# Patient Record
Sex: MALE | Race: WHITE | NOT HISPANIC OR LATINO | URBAN - METROPOLITAN AREA
[De-identification: names, ages, dates, MRNs, and addresses within clinical notes are randomized per-mention and may not be internally consistent; named-entity substitution may affect disease eponyms.]

---

## 2017-03-03 ENCOUNTER — OUTPATIENT (OUTPATIENT)
Dept: OUTPATIENT SERVICES | Facility: HOSPITAL | Age: 77
LOS: 1 days | Discharge: HOME | End: 2017-03-03

## 2017-06-13 ENCOUNTER — OUTPATIENT (OUTPATIENT)
Dept: OUTPATIENT SERVICES | Facility: HOSPITAL | Age: 77
LOS: 1 days | Discharge: HOME | End: 2017-06-13

## 2017-06-28 DIAGNOSIS — R53.83 OTHER FATIGUE: ICD-10-CM

## 2017-06-28 DIAGNOSIS — E55.9 VITAMIN D DEFICIENCY, UNSPECIFIED: ICD-10-CM

## 2017-06-28 DIAGNOSIS — E03.9 HYPOTHYROIDISM, UNSPECIFIED: ICD-10-CM

## 2017-06-28 DIAGNOSIS — D64.9 ANEMIA, UNSPECIFIED: ICD-10-CM

## 2017-06-28 DIAGNOSIS — N39.0 URINARY TRACT INFECTION, SITE NOT SPECIFIED: ICD-10-CM

## 2017-06-28 DIAGNOSIS — R79.9 ABNORMAL FINDING OF BLOOD CHEMISTRY, UNSPECIFIED: ICD-10-CM

## 2017-06-28 DIAGNOSIS — E13.9 OTHER SPECIFIED DIABETES MELLITUS WITHOUT COMPLICATIONS: ICD-10-CM

## 2017-08-09 DIAGNOSIS — R79.9 ABNORMAL FINDING OF BLOOD CHEMISTRY, UNSPECIFIED: ICD-10-CM

## 2017-08-09 DIAGNOSIS — E13.9 OTHER SPECIFIED DIABETES MELLITUS WITHOUT COMPLICATIONS: ICD-10-CM

## 2017-08-09 DIAGNOSIS — E78.2 MIXED HYPERLIPIDEMIA: ICD-10-CM

## 2017-08-09 DIAGNOSIS — E03.9 HYPOTHYROIDISM, UNSPECIFIED: ICD-10-CM

## 2017-08-09 DIAGNOSIS — D64.9 ANEMIA, UNSPECIFIED: ICD-10-CM

## 2017-08-09 DIAGNOSIS — E55.9 VITAMIN D DEFICIENCY, UNSPECIFIED: ICD-10-CM

## 2017-08-09 DIAGNOSIS — N40.0 BENIGN PROSTATIC HYPERPLASIA WITHOUT LOWER URINARY TRACT SYMPTOMS: ICD-10-CM

## 2017-08-09 DIAGNOSIS — R53.83 OTHER FATIGUE: ICD-10-CM

## 2017-08-24 ENCOUNTER — OUTPATIENT (OUTPATIENT)
Dept: OUTPATIENT SERVICES | Facility: HOSPITAL | Age: 77
LOS: 1 days | Discharge: HOME | End: 2017-08-24

## 2017-08-24 DIAGNOSIS — E04.2 NONTOXIC MULTINODULAR GOITER: ICD-10-CM

## 2017-10-13 ENCOUNTER — OUTPATIENT (OUTPATIENT)
Dept: OUTPATIENT SERVICES | Facility: HOSPITAL | Age: 77
LOS: 1 days | Discharge: HOME | End: 2017-10-13

## 2017-10-13 DIAGNOSIS — E13.9 OTHER SPECIFIED DIABETES MELLITUS WITHOUT COMPLICATIONS: ICD-10-CM

## 2017-10-13 DIAGNOSIS — D64.9 ANEMIA, UNSPECIFIED: ICD-10-CM

## 2017-10-13 DIAGNOSIS — E55.9 VITAMIN D DEFICIENCY, UNSPECIFIED: ICD-10-CM

## 2017-10-13 DIAGNOSIS — N40.0 BENIGN PROSTATIC HYPERPLASIA WITHOUT LOWER URINARY TRACT SYMPTOMS: ICD-10-CM

## 2017-10-13 DIAGNOSIS — E78.2 MIXED HYPERLIPIDEMIA: ICD-10-CM

## 2017-10-13 DIAGNOSIS — D52.9 FOLATE DEFICIENCY ANEMIA, UNSPECIFIED: ICD-10-CM

## 2017-10-13 DIAGNOSIS — R53.83 OTHER FATIGUE: ICD-10-CM

## 2017-10-13 DIAGNOSIS — R79.9 ABNORMAL FINDING OF BLOOD CHEMISTRY, UNSPECIFIED: ICD-10-CM

## 2017-10-13 DIAGNOSIS — E53.8 DEFICIENCY OF OTHER SPECIFIED B GROUP VITAMINS: ICD-10-CM

## 2017-10-13 DIAGNOSIS — N39.0 URINARY TRACT INFECTION, SITE NOT SPECIFIED: ICD-10-CM

## 2017-10-13 DIAGNOSIS — E03.9 HYPOTHYROIDISM, UNSPECIFIED: ICD-10-CM

## 2017-10-13 DIAGNOSIS — D51.9 VITAMIN B12 DEFICIENCY ANEMIA, UNSPECIFIED: ICD-10-CM

## 2017-10-13 DIAGNOSIS — D51.8 OTHER VITAMIN B12 DEFICIENCY ANEMIAS: ICD-10-CM

## 2017-11-21 ENCOUNTER — EMERGENCY (EMERGENCY)
Facility: HOSPITAL | Age: 77
LOS: 0 days | Discharge: HOME | End: 2017-11-21
Admitting: INTERNAL MEDICINE

## 2017-11-21 DIAGNOSIS — E78.00 PURE HYPERCHOLESTEROLEMIA, UNSPECIFIED: ICD-10-CM

## 2017-11-21 DIAGNOSIS — M54.2 CERVICALGIA: ICD-10-CM

## 2017-11-21 DIAGNOSIS — M62.838 OTHER MUSCLE SPASM: ICD-10-CM

## 2018-01-27 ENCOUNTER — OUTPATIENT (OUTPATIENT)
Dept: OUTPATIENT SERVICES | Facility: HOSPITAL | Age: 78
LOS: 1 days | Discharge: HOME | End: 2018-01-27

## 2018-01-27 DIAGNOSIS — E78.2 MIXED HYPERLIPIDEMIA: ICD-10-CM

## 2018-01-27 DIAGNOSIS — R53.83 OTHER FATIGUE: ICD-10-CM

## 2018-01-27 DIAGNOSIS — E55.9 VITAMIN D DEFICIENCY, UNSPECIFIED: ICD-10-CM

## 2018-01-27 DIAGNOSIS — E03.9 HYPOTHYROIDISM, UNSPECIFIED: ICD-10-CM

## 2018-01-27 DIAGNOSIS — N40.0 BENIGN PROSTATIC HYPERPLASIA WITHOUT LOWER URINARY TRACT SYMPTOMS: ICD-10-CM

## 2018-01-27 DIAGNOSIS — R79.9 ABNORMAL FINDING OF BLOOD CHEMISTRY, UNSPECIFIED: ICD-10-CM

## 2018-01-27 DIAGNOSIS — E13.9 OTHER SPECIFIED DIABETES MELLITUS WITHOUT COMPLICATIONS: ICD-10-CM

## 2018-01-27 DIAGNOSIS — D51.9 VITAMIN B12 DEFICIENCY ANEMIA, UNSPECIFIED: ICD-10-CM

## 2018-01-27 DIAGNOSIS — N39.0 URINARY TRACT INFECTION, SITE NOT SPECIFIED: ICD-10-CM

## 2018-01-27 DIAGNOSIS — D52.9 FOLATE DEFICIENCY ANEMIA, UNSPECIFIED: ICD-10-CM

## 2018-02-27 ENCOUNTER — OUTPATIENT (OUTPATIENT)
Dept: OUTPATIENT SERVICES | Facility: HOSPITAL | Age: 78
LOS: 1 days | Discharge: HOME | End: 2018-02-27

## 2018-02-27 DIAGNOSIS — E03.9 HYPOTHYROIDISM, UNSPECIFIED: ICD-10-CM

## 2018-08-24 ENCOUNTER — OUTPATIENT (OUTPATIENT)
Dept: OUTPATIENT SERVICES | Facility: HOSPITAL | Age: 78
LOS: 1 days | Discharge: HOME | End: 2018-08-24

## 2018-08-24 DIAGNOSIS — N40.0 BENIGN PROSTATIC HYPERPLASIA WITHOUT LOWER URINARY TRACT SYMPTOMS: ICD-10-CM

## 2018-08-24 DIAGNOSIS — E53.8 DEFICIENCY OF OTHER SPECIFIED B GROUP VITAMINS: ICD-10-CM

## 2018-08-24 DIAGNOSIS — R53.83 OTHER FATIGUE: ICD-10-CM

## 2018-08-24 DIAGNOSIS — Z00.00 ENCOUNTER FOR GENERAL ADULT MEDICAL EXAMINATION WITHOUT ABNORMAL FINDINGS: ICD-10-CM

## 2018-08-24 DIAGNOSIS — D52.9 FOLATE DEFICIENCY ANEMIA, UNSPECIFIED: ICD-10-CM

## 2018-08-24 DIAGNOSIS — E55.9 VITAMIN D DEFICIENCY, UNSPECIFIED: ICD-10-CM

## 2018-08-24 DIAGNOSIS — N39.0 URINARY TRACT INFECTION, SITE NOT SPECIFIED: ICD-10-CM

## 2018-08-24 DIAGNOSIS — E78.2 MIXED HYPERLIPIDEMIA: ICD-10-CM

## 2018-08-24 DIAGNOSIS — E03.9 HYPOTHYROIDISM, UNSPECIFIED: ICD-10-CM

## 2018-08-24 DIAGNOSIS — E13.9 OTHER SPECIFIED DIABETES MELLITUS WITHOUT COMPLICATIONS: ICD-10-CM

## 2018-08-24 DIAGNOSIS — D51.9 VITAMIN B12 DEFICIENCY ANEMIA, UNSPECIFIED: ICD-10-CM

## 2018-08-24 DIAGNOSIS — R79.9 ABNORMAL FINDING OF BLOOD CHEMISTRY, UNSPECIFIED: ICD-10-CM

## 2019-01-30 ENCOUNTER — OUTPATIENT (OUTPATIENT)
Dept: OUTPATIENT SERVICES | Facility: HOSPITAL | Age: 79
LOS: 1 days | Discharge: HOME | End: 2019-01-30

## 2019-01-30 DIAGNOSIS — E04.2 NONTOXIC MULTINODULAR GOITER: ICD-10-CM

## 2019-08-01 ENCOUNTER — INPATIENT (INPATIENT)
Facility: HOSPITAL | Age: 79
LOS: 4 days | Discharge: NURSING, REHAB OR LONG-TERM CARE | End: 2019-08-06
Attending: SURGERY | Admitting: SURGERY
Payer: COMMERCIAL

## 2019-08-01 VITALS
SYSTOLIC BLOOD PRESSURE: 165 MMHG | DIASTOLIC BLOOD PRESSURE: 80 MMHG | RESPIRATION RATE: 20 BRPM | OXYGEN SATURATION: 98 % | HEART RATE: 63 BPM | TEMPERATURE: 97 F

## 2019-08-01 DIAGNOSIS — Z98.890 OTHER SPECIFIED POSTPROCEDURAL STATES: Chronic | ICD-10-CM

## 2019-08-01 DIAGNOSIS — Z90.49 ACQUIRED ABSENCE OF OTHER SPECIFIED PARTS OF DIGESTIVE TRACT: Chronic | ICD-10-CM

## 2019-08-01 LAB
ALBUMIN SERPL ELPH-MCNC: 3.9 G/DL — SIGNIFICANT CHANGE UP (ref 3.5–5.2)
ALP SERPL-CCNC: 65 U/L — SIGNIFICANT CHANGE UP (ref 30–115)
ALT FLD-CCNC: 27 U/L — SIGNIFICANT CHANGE UP (ref 0–41)
ANION GAP SERPL CALC-SCNC: 11 MMOL/L — SIGNIFICANT CHANGE UP (ref 7–14)
APPEARANCE UR: CLEAR — SIGNIFICANT CHANGE UP
APTT BLD: 33.5 SEC — SIGNIFICANT CHANGE UP (ref 27–39.2)
AST SERPL-CCNC: 19 U/L — SIGNIFICANT CHANGE UP (ref 0–41)
BASE EXCESS BLDV CALC-SCNC: 3.6 MMOL/L — HIGH (ref -2–2)
BASOPHILS # BLD AUTO: 0.03 K/UL — SIGNIFICANT CHANGE UP (ref 0–0.2)
BASOPHILS NFR BLD AUTO: 0.3 % — SIGNIFICANT CHANGE UP (ref 0–1)
BILIRUB SERPL-MCNC: 0.3 MG/DL — SIGNIFICANT CHANGE UP (ref 0.2–1.2)
BILIRUB UR-MCNC: NEGATIVE — SIGNIFICANT CHANGE UP
BLD GP AB SCN SERPL QL: SIGNIFICANT CHANGE UP
BUN SERPL-MCNC: 11 MG/DL — SIGNIFICANT CHANGE UP (ref 10–20)
CA-I SERPL-SCNC: 1.24 MMOL/L — SIGNIFICANT CHANGE UP (ref 1.12–1.3)
CALCIUM SERPL-MCNC: 9.7 MG/DL — SIGNIFICANT CHANGE UP (ref 8.5–10.1)
CHLORIDE SERPL-SCNC: 104 MMOL/L — SIGNIFICANT CHANGE UP (ref 98–110)
CO2 SERPL-SCNC: 25 MMOL/L — SIGNIFICANT CHANGE UP (ref 17–32)
COLOR SPEC: SIGNIFICANT CHANGE UP
CREAT SERPL-MCNC: 1 MG/DL — SIGNIFICANT CHANGE UP (ref 0.7–1.5)
DIFF PNL FLD: NEGATIVE — SIGNIFICANT CHANGE UP
EOSINOPHIL # BLD AUTO: 0.06 K/UL — SIGNIFICANT CHANGE UP (ref 0–0.7)
EOSINOPHIL NFR BLD AUTO: 0.7 % — SIGNIFICANT CHANGE UP (ref 0–8)
GAS PNL BLDV: 139 MMOL/L — SIGNIFICANT CHANGE UP (ref 136–145)
GAS PNL BLDV: SIGNIFICANT CHANGE UP
GLUCOSE SERPL-MCNC: 164 MG/DL — HIGH (ref 70–99)
GLUCOSE UR QL: NEGATIVE — SIGNIFICANT CHANGE UP
HCO3 BLDV-SCNC: 30 MMOL/L — HIGH (ref 22–29)
HCT VFR BLD CALC: 43 % — SIGNIFICANT CHANGE UP (ref 42–52)
HCT VFR BLDA CALC: 45.4 % — HIGH (ref 34–44)
HGB BLD CALC-MCNC: 14.8 G/DL — SIGNIFICANT CHANGE UP (ref 14–18)
HGB BLD-MCNC: 14.6 G/DL — SIGNIFICANT CHANGE UP (ref 14–18)
IMM GRANULOCYTES NFR BLD AUTO: 0.7 % — HIGH (ref 0.1–0.3)
INR BLD: 1.09 RATIO — SIGNIFICANT CHANGE UP (ref 0.65–1.3)
KETONES UR-MCNC: NEGATIVE — SIGNIFICANT CHANGE UP
LACTATE BLDV-MCNC: 1.3 MMOL/L — SIGNIFICANT CHANGE UP (ref 0.5–1.6)
LEUKOCYTE ESTERASE UR-ACNC: NEGATIVE — SIGNIFICANT CHANGE UP
LIDOCAIN IGE QN: 30 U/L — SIGNIFICANT CHANGE UP (ref 7–60)
LYMPHOCYTES # BLD AUTO: 0.83 K/UL — LOW (ref 1.2–3.4)
LYMPHOCYTES # BLD AUTO: 9.3 % — LOW (ref 20.5–51.1)
MCHC RBC-ENTMCNC: 31.1 PG — HIGH (ref 27–31)
MCHC RBC-ENTMCNC: 34 G/DL — SIGNIFICANT CHANGE UP (ref 32–37)
MCV RBC AUTO: 91.5 FL — SIGNIFICANT CHANGE UP (ref 80–94)
MONOCYTES # BLD AUTO: 0.45 K/UL — SIGNIFICANT CHANGE UP (ref 0.1–0.6)
MONOCYTES NFR BLD AUTO: 5 % — SIGNIFICANT CHANGE UP (ref 1.7–9.3)
NEUTROPHILS # BLD AUTO: 7.53 K/UL — HIGH (ref 1.4–6.5)
NEUTROPHILS NFR BLD AUTO: 84 % — HIGH (ref 42.2–75.2)
NITRITE UR-MCNC: NEGATIVE — SIGNIFICANT CHANGE UP
NRBC # BLD: 0 /100 WBCS — SIGNIFICANT CHANGE UP (ref 0–0)
PCO2 BLDV: 51 MMHG — SIGNIFICANT CHANGE UP (ref 41–51)
PH BLDV: 7.38 — SIGNIFICANT CHANGE UP (ref 7.26–7.43)
PH UR: 6 — SIGNIFICANT CHANGE UP (ref 5–8)
PLATELET # BLD AUTO: 228 K/UL — SIGNIFICANT CHANGE UP (ref 130–400)
PO2 BLDV: 25 MMHG — SIGNIFICANT CHANGE UP (ref 20–40)
POTASSIUM BLDV-SCNC: 4.1 MMOL/L — SIGNIFICANT CHANGE UP (ref 3.3–5.6)
POTASSIUM SERPL-MCNC: 4.5 MMOL/L — SIGNIFICANT CHANGE UP (ref 3.5–5)
POTASSIUM SERPL-SCNC: 4.5 MMOL/L — SIGNIFICANT CHANGE UP (ref 3.5–5)
PROT SERPL-MCNC: 6.4 G/DL — SIGNIFICANT CHANGE UP (ref 6–8)
PROT UR-MCNC: NEGATIVE — SIGNIFICANT CHANGE UP
PROTHROM AB SERPL-ACNC: 12.5 SEC — SIGNIFICANT CHANGE UP (ref 9.95–12.87)
RBC # BLD: 4.7 M/UL — SIGNIFICANT CHANGE UP (ref 4.7–6.1)
RBC # FLD: 13 % — SIGNIFICANT CHANGE UP (ref 11.5–14.5)
SAO2 % BLDV: 42 % — SIGNIFICANT CHANGE UP
SODIUM SERPL-SCNC: 140 MMOL/L — SIGNIFICANT CHANGE UP (ref 135–146)
SP GR SPEC: 1.04 — HIGH (ref 1.01–1.02)
UROBILINOGEN FLD QL: SIGNIFICANT CHANGE UP
WBC # BLD: 8.96 K/UL — SIGNIFICANT CHANGE UP (ref 4.8–10.8)
WBC # FLD AUTO: 8.96 K/UL — SIGNIFICANT CHANGE UP (ref 4.8–10.8)

## 2019-08-01 PROCEDURE — 74177 CT ABD & PELVIS W/CONTRAST: CPT | Mod: 26

## 2019-08-01 PROCEDURE — 93010 ELECTROCARDIOGRAM REPORT: CPT

## 2019-08-01 PROCEDURE — 99285 EMERGENCY DEPT VISIT HI MDM: CPT

## 2019-08-01 PROCEDURE — 99223 1ST HOSP IP/OBS HIGH 75: CPT

## 2019-08-01 PROCEDURE — 71045 X-RAY EXAM CHEST 1 VIEW: CPT | Mod: 26

## 2019-08-01 RX ORDER — LEVOTHYROXINE SODIUM 125 MCG
75 TABLET ORAL DAILY
Refills: 0 | Status: DISCONTINUED | OUTPATIENT
Start: 2019-08-01 | End: 2019-08-04

## 2019-08-01 RX ORDER — SODIUM CHLORIDE 9 MG/ML
1000 INJECTION INTRAMUSCULAR; INTRAVENOUS; SUBCUTANEOUS ONCE
Refills: 0 | Status: COMPLETED | OUTPATIENT
Start: 2019-08-01 | End: 2019-08-01

## 2019-08-01 RX ORDER — PANTOPRAZOLE SODIUM 20 MG/1
40 TABLET, DELAYED RELEASE ORAL
Refills: 0 | Status: DISCONTINUED | OUTPATIENT
Start: 2019-08-01 | End: 2019-08-04

## 2019-08-01 RX ORDER — LISINOPRIL 2.5 MG/1
40 TABLET ORAL DAILY
Refills: 0 | Status: DISCONTINUED | OUTPATIENT
Start: 2019-08-01 | End: 2019-08-04

## 2019-08-01 RX ORDER — GLUCAGON INJECTION, SOLUTION 0.5 MG/.1ML
1 INJECTION, SOLUTION SUBCUTANEOUS ONCE
Refills: 0 | Status: DISCONTINUED | OUTPATIENT
Start: 2019-08-01 | End: 2019-08-04

## 2019-08-01 RX ORDER — DEXTROSE 50 % IN WATER 50 %
15 SYRINGE (ML) INTRAVENOUS ONCE
Refills: 0 | Status: DISCONTINUED | OUTPATIENT
Start: 2019-08-01 | End: 2019-08-04

## 2019-08-01 RX ORDER — HEPARIN SODIUM 5000 [USP'U]/ML
5000 INJECTION INTRAVENOUS; SUBCUTANEOUS EVERY 8 HOURS
Refills: 0 | Status: DISCONTINUED | OUTPATIENT
Start: 2019-08-01 | End: 2019-08-04

## 2019-08-01 RX ORDER — SODIUM CHLORIDE 9 MG/ML
1000 INJECTION, SOLUTION INTRAVENOUS
Refills: 0 | Status: DISCONTINUED | OUTPATIENT
Start: 2019-08-01 | End: 2019-08-04

## 2019-08-01 RX ORDER — DEXTROSE 50 % IN WATER 50 %
12.5 SYRINGE (ML) INTRAVENOUS ONCE
Refills: 0 | Status: DISCONTINUED | OUTPATIENT
Start: 2019-08-01 | End: 2019-08-04

## 2019-08-01 RX ORDER — DEXTROSE 50 % IN WATER 50 %
25 SYRINGE (ML) INTRAVENOUS ONCE
Refills: 0 | Status: DISCONTINUED | OUTPATIENT
Start: 2019-08-01 | End: 2019-08-04

## 2019-08-01 RX ORDER — INSULIN LISPRO 100/ML
VIAL (ML) SUBCUTANEOUS
Refills: 0 | Status: DISCONTINUED | OUTPATIENT
Start: 2019-08-01 | End: 2019-08-04

## 2019-08-01 RX ORDER — SODIUM CHLORIDE 9 MG/ML
1000 INJECTION INTRAMUSCULAR; INTRAVENOUS; SUBCUTANEOUS
Refills: 0 | Status: DISCONTINUED | OUTPATIENT
Start: 2019-08-01 | End: 2019-08-04

## 2019-08-01 RX ORDER — ATORVASTATIN CALCIUM 80 MG/1
80 TABLET, FILM COATED ORAL AT BEDTIME
Refills: 0 | Status: DISCONTINUED | OUTPATIENT
Start: 2019-08-01 | End: 2019-08-04

## 2019-08-01 RX ORDER — IOHEXOL 300 MG/ML
30 INJECTION, SOLUTION INTRAVENOUS ONCE
Refills: 0 | Status: COMPLETED | OUTPATIENT
Start: 2019-08-01 | End: 2019-08-01

## 2019-08-01 RX ORDER — CHLORHEXIDINE GLUCONATE 213 G/1000ML
1 SOLUTION TOPICAL
Refills: 0 | Status: DISCONTINUED | OUTPATIENT
Start: 2019-08-01 | End: 2019-08-04

## 2019-08-01 RX ADMIN — SODIUM CHLORIDE 100 MILLILITER(S): 9 INJECTION INTRAMUSCULAR; INTRAVENOUS; SUBCUTANEOUS at 21:10

## 2019-08-01 RX ADMIN — IOHEXOL 30 MILLILITER(S): 300 INJECTION, SOLUTION INTRAVENOUS at 14:56

## 2019-08-01 RX ADMIN — SODIUM CHLORIDE 2000 MILLILITER(S): 9 INJECTION INTRAMUSCULAR; INTRAVENOUS; SUBCUTANEOUS at 14:58

## 2019-08-01 NOTE — H&P ADULT - NSHPLABSRESULTS_GEN_ALL_CORE
Labs;                          14.6   8.96  )-----------( 228      ( 01 Aug 2019 14:00 )             43.0       Auto Neutrophil %: 84.0 % (08-01-19 @ 14:00)  Auto Immature Granulocyte %: 0.7 % (08-01-19 @ 14:00)    08-01    140  |  104  |  11  ----------------------------<  164<H>  4.5   |  25  |  1.0      Calcium, Total Serum: 9.7 mg/dL (08-01-19 @ 14:00)      LFTs:             6.4  | 0.3  | 19       ------------------[65      ( 01 Aug 2019 14:00 )  3.9  | x    | 27          Lipase:30     Amylase:x         Blood Gas Venous - Lactate: 1.3 mmoL/L (08-01-19 @ 18:29)      Coags:     12.50  ----< 1.09    ( 01 Aug 2019 18:55 )     33.5        < from: Xray Chest 1 View-PORTABLE IMMEDIATE (08.01.19 @ 14:58) >  Impression:    Bibasilar atelectasis.  < end of copied text >    < from: CT Abdomen and Pelvis w/ Oral Cont and w/ IV Cont (08.01.19 @ 16:39) >  IMPRESSION:   Ventral hernia containing a short segment of small bowel as well as   adjacent free fluid in the hernia sac. Findings are consistent with a   closed loop obstruction with possible superimposed ischemic changes.   Surgical evaluation is recommended.  < end of copied text >

## 2019-08-01 NOTE — ED ADULT NURSE NOTE - OBJECTIVE STATEMENT
Pt c/o abdominal pain, left and right sided, nausea, and loose stools. Lat BM yesterday. Denies fever, chills, vomiting, bloody stools, hematuria, chest pain, or SOB. Pt is A&ox4, Abdomen is rounded, nondistended, nontender at this time.

## 2019-08-01 NOTE — H&P ADULT - HISTORY OF PRESENT ILLNESS
79 year old male presents with abdominal pain. Patient states he has a chronic hernia that usually gives him pain on and off that resolves but today the pain didnt go away so he came for an evaluation. Patient states he had a colectomy and reversal about 5 years ago, at the time the surgeon told him they were unable to completely fix the hernia. Denies having a colonoscopy after his surgery.

## 2019-08-01 NOTE — ED PROVIDER NOTE - CARE PLAN
Assessment and plan of treatment:	r/o colitis/diverticulitis, early sbo, uti, less likely hb, pancreatitis, gastritis, aortic pathology Principal Discharge DX:	Incarcerated hernia  Assessment and plan of treatment:	r/o colitis/diverticulitis, early sbo, uti, less likely hb, pancreatitis, gastritis, aortic pathology

## 2019-08-01 NOTE — ED PROVIDER NOTE - PLAN OF CARE
r/o colitis/diverticulitis, early sbo, uti, less likely hb, pancreatitis, gastritis, aortic pathology

## 2019-08-01 NOTE — ED PROVIDER NOTE - PROGRESS NOTE DETAILS
case d/w surgery- incarcerated hernia. pt placed in room. pt updated on results. abdo still soft, nonperitonitic. preop labs, vbg ordered. d/w surg- admit under Shaheen

## 2019-08-01 NOTE — H&P ADULT - NSHPPHYSICALEXAM_GEN_ALL_CORE
Vital Signs Last 24 Hrs  T(C): 36 (01 Aug 2019 17:28), Max: 36.1 (01 Aug 2019 12:51)  T(F): 96.8 (01 Aug 2019 17:28), Max: 96.9 (01 Aug 2019 12:51)  HR: 60 (01 Aug 2019 17:28) (60 - 63)  BP: 174/84 (01 Aug 2019 17:28) (165/80 - 174/84)  RR: 20 (01 Aug 2019 17:28) (20 - 20)  SpO2: 95% (01 Aug 2019 17:28) (95% - 98%)    PHYSICAL EXAM:  GENERAL: NAD, well-appearing  CHEST/LUNG: Clear to auscultation bilaterally  HEART: Regular rate and rhythm  ABDOMEN: Soft, Nontender, Nondistended, midline and llq scar, lower abdominal/midline ventral hernia reduced at bedside   EXTREMITIES:  No clubbing, cyanosis, or edema

## 2019-08-01 NOTE — H&P ADULT - ASSESSMENT
79 year old male with reducible ventral hernia, CT scan was significant for "Ventral hernia containing a short segment of small bowel as well as adjacent free fluid in the hernia sac. Findings are consistent with a closed loop obstruction with possible superimposed ischemic changes."    -admit for observation and serial abdominal exams    d/w Dr. Jamison 79 year old male with reducible ventral hernia, CT scan was significant for "Ventral hernia containing a short segment of small bowel as well as adjacent free fluid in the hernia sac. Findings are consistent with a closed loop obstruction with possible superimposed ischemic changes."      Plan:  Admit to surgical service  NPO, IVF  Resume home medications  Observation s/p hernia reduction - no signs of sbo - continues to pass gas and have bowel movements  Repair of hernia this admission if patient agreeable  NG tube if nauseous or begins to vomit

## 2019-08-01 NOTE — ED PROVIDER NOTE - OBJECTIVE STATEMENT
80 yo m hx diverticulosis s/p colectomy w/ ostomy, s/p reversal, R inguinal hernia s/p repair, cad, htn, dm, hld here for1 week low abdo pain. pain LLQ -> RLQ. + nausea. + loose stools. Last bm yesterday and loose. no blood in stool. no fever/chills. sx began after eating pistachio nut. no dysuria

## 2019-08-01 NOTE — H&P ADULT - NSICDXPASTSURGICALHX_GEN_ALL_CORE_FT
PAST SURGICAL HISTORY:  H/O inguinal hernia repair     History of open heart surgery     History of partial colectomy

## 2019-08-01 NOTE — ED PROVIDER NOTE - NS ED ROS FT
Constitutional: no fever or rigors  Eyes: no eye redness, acute visual change  ENMT: no ear pain, no throat pain  Card: no chest pain, no palpitations  Pulm: no cough, no shortness of breath  GI: pos abdominal pain, nausea, diarrhea  : no dysuria or hematuria  MSK: no limitation in range of motion, no neck pain  Skin: no rash, no abrasion  Neuro: no numbness, no weakness  Heme/Onc: no easy bruising, no bleeding tendency   Allergic: no hives, no throat swelling

## 2019-08-01 NOTE — ED PROVIDER NOTE - CLINICAL SUMMARY MEDICAL DECISION MAKING FREE TEXT BOX
pt here for abdominal pain, nausea. ed w/u revealing incarcerated hernia, c/f bowel ischaemia. seen by surg. hernia reduced and approved for admission

## 2019-08-01 NOTE — ED PROVIDER NOTE - PHYSICAL EXAMINATION
PHYSICAL EXAM:    Constitutional: awake, alert, NAD  Eyes: EOMI, no conj injection  HENT: NC AT  Back: no c/t/l spine ttp  Respiratory: no respiratory distress, breath sounds equal b/l, no wheezing, rhonchi or stridor.   Cardiovascular: RRR nml S1S2  Gastrointestinal: soft, no masses, mild RLQ, LLQ, suprapubic tenderness, mildly distended. No guarding or rebound.   Extremities: no peripheral edema  Neurological: AAOx3, CN II-XII grossly intact, no focal numbness or weakness  Skin: no rash  Musculoskeletal: no gross deformity

## 2019-08-02 LAB
ANION GAP SERPL CALC-SCNC: 10 MMOL/L — SIGNIFICANT CHANGE UP (ref 7–14)
ANION GAP SERPL CALC-SCNC: 9 MMOL/L — SIGNIFICANT CHANGE UP (ref 7–14)
BASOPHILS # BLD AUTO: 0.02 K/UL — SIGNIFICANT CHANGE UP (ref 0–0.2)
BASOPHILS # BLD AUTO: 0.04 K/UL — SIGNIFICANT CHANGE UP (ref 0–0.2)
BASOPHILS NFR BLD AUTO: 0.3 % — SIGNIFICANT CHANGE UP (ref 0–1)
BASOPHILS NFR BLD AUTO: 0.4 % — SIGNIFICANT CHANGE UP (ref 0–1)
BLD GP AB SCN SERPL QL: SIGNIFICANT CHANGE UP
BUN SERPL-MCNC: 10 MG/DL — SIGNIFICANT CHANGE UP (ref 10–20)
BUN SERPL-MCNC: 9 MG/DL — LOW (ref 10–20)
CALCIUM SERPL-MCNC: 8.7 MG/DL — SIGNIFICANT CHANGE UP (ref 8.5–10.1)
CALCIUM SERPL-MCNC: 9.3 MG/DL — SIGNIFICANT CHANGE UP (ref 8.5–10.1)
CHLORIDE SERPL-SCNC: 104 MMOL/L — SIGNIFICANT CHANGE UP (ref 98–110)
CHLORIDE SERPL-SCNC: 105 MMOL/L — SIGNIFICANT CHANGE UP (ref 98–110)
CO2 SERPL-SCNC: 27 MMOL/L — SIGNIFICANT CHANGE UP (ref 17–32)
CO2 SERPL-SCNC: 27 MMOL/L — SIGNIFICANT CHANGE UP (ref 17–32)
CREAT SERPL-MCNC: 1 MG/DL — SIGNIFICANT CHANGE UP (ref 0.7–1.5)
CREAT SERPL-MCNC: 1 MG/DL — SIGNIFICANT CHANGE UP (ref 0.7–1.5)
EOSINOPHIL # BLD AUTO: 0.22 K/UL — SIGNIFICANT CHANGE UP (ref 0–0.7)
EOSINOPHIL # BLD AUTO: 0.27 K/UL — SIGNIFICANT CHANGE UP (ref 0–0.7)
EOSINOPHIL NFR BLD AUTO: 2.3 % — SIGNIFICANT CHANGE UP (ref 0–8)
EOSINOPHIL NFR BLD AUTO: 4.2 % — SIGNIFICANT CHANGE UP (ref 0–8)
ESTIMATED AVERAGE GLUCOSE: 151 MG/DL — HIGH (ref 68–114)
GLUCOSE BLDC GLUCOMTR-MCNC: 109 MG/DL — HIGH (ref 70–99)
GLUCOSE BLDC GLUCOMTR-MCNC: 115 MG/DL — HIGH (ref 70–99)
GLUCOSE BLDC GLUCOMTR-MCNC: 124 MG/DL — HIGH (ref 70–99)
GLUCOSE BLDC GLUCOMTR-MCNC: 125 MG/DL — HIGH (ref 70–99)
GLUCOSE SERPL-MCNC: 102 MG/DL — HIGH (ref 70–99)
GLUCOSE SERPL-MCNC: 117 MG/DL — HIGH (ref 70–99)
HBA1C BLD-MCNC: 6.9 % — HIGH (ref 4–5.6)
HCT VFR BLD CALC: 38 % — LOW (ref 42–52)
HCT VFR BLD CALC: 40.5 % — LOW (ref 42–52)
HGB BLD-MCNC: 12.9 G/DL — LOW (ref 14–18)
HGB BLD-MCNC: 13.7 G/DL — LOW (ref 14–18)
IMM GRANULOCYTES NFR BLD AUTO: 0.3 % — SIGNIFICANT CHANGE UP (ref 0.1–0.3)
IMM GRANULOCYTES NFR BLD AUTO: 0.5 % — HIGH (ref 0.1–0.3)
LYMPHOCYTES # BLD AUTO: 1.6 K/UL — SIGNIFICANT CHANGE UP (ref 1.2–3.4)
LYMPHOCYTES # BLD AUTO: 1.87 K/UL — SIGNIFICANT CHANGE UP (ref 1.2–3.4)
LYMPHOCYTES # BLD AUTO: 19.7 % — LOW (ref 20.5–51.1)
LYMPHOCYTES # BLD AUTO: 24.9 % — SIGNIFICANT CHANGE UP (ref 20.5–51.1)
MAGNESIUM SERPL-MCNC: 1.7 MG/DL — LOW (ref 1.8–2.4)
MAGNESIUM SERPL-MCNC: 2 MG/DL — SIGNIFICANT CHANGE UP (ref 1.8–2.4)
MCHC RBC-ENTMCNC: 30.9 PG — SIGNIFICANT CHANGE UP (ref 27–31)
MCHC RBC-ENTMCNC: 31.1 PG — HIGH (ref 27–31)
MCHC RBC-ENTMCNC: 33.8 G/DL — SIGNIFICANT CHANGE UP (ref 32–37)
MCHC RBC-ENTMCNC: 33.9 G/DL — SIGNIFICANT CHANGE UP (ref 32–37)
MCV RBC AUTO: 91.2 FL — SIGNIFICANT CHANGE UP (ref 80–94)
MCV RBC AUTO: 91.6 FL — SIGNIFICANT CHANGE UP (ref 80–94)
MONOCYTES # BLD AUTO: 0.54 K/UL — SIGNIFICANT CHANGE UP (ref 0.1–0.6)
MONOCYTES # BLD AUTO: 0.77 K/UL — HIGH (ref 0.1–0.6)
MONOCYTES NFR BLD AUTO: 8.1 % — SIGNIFICANT CHANGE UP (ref 1.7–9.3)
MONOCYTES NFR BLD AUTO: 8.4 % — SIGNIFICANT CHANGE UP (ref 1.7–9.3)
NEUTROPHILS # BLD AUTO: 3.98 K/UL — SIGNIFICANT CHANGE UP (ref 1.4–6.5)
NEUTROPHILS # BLD AUTO: 6.56 K/UL — HIGH (ref 1.4–6.5)
NEUTROPHILS NFR BLD AUTO: 61.9 % — SIGNIFICANT CHANGE UP (ref 42.2–75.2)
NEUTROPHILS NFR BLD AUTO: 69 % — SIGNIFICANT CHANGE UP (ref 42.2–75.2)
NRBC # BLD: 0 /100 WBCS — SIGNIFICANT CHANGE UP (ref 0–0)
NRBC # BLD: 0 /100 WBCS — SIGNIFICANT CHANGE UP (ref 0–0)
PHOSPHATE SERPL-MCNC: 2.6 MG/DL — SIGNIFICANT CHANGE UP (ref 2.1–4.9)
PHOSPHATE SERPL-MCNC: 2.8 MG/DL — SIGNIFICANT CHANGE UP (ref 2.1–4.9)
PLATELET # BLD AUTO: 199 K/UL — SIGNIFICANT CHANGE UP (ref 130–400)
PLATELET # BLD AUTO: 202 K/UL — SIGNIFICANT CHANGE UP (ref 130–400)
POTASSIUM SERPL-MCNC: 4 MMOL/L — SIGNIFICANT CHANGE UP (ref 3.5–5)
POTASSIUM SERPL-MCNC: 4.3 MMOL/L — SIGNIFICANT CHANGE UP (ref 3.5–5)
POTASSIUM SERPL-SCNC: 4 MMOL/L — SIGNIFICANT CHANGE UP (ref 3.5–5)
POTASSIUM SERPL-SCNC: 4.3 MMOL/L — SIGNIFICANT CHANGE UP (ref 3.5–5)
RBC # BLD: 4.15 M/UL — LOW (ref 4.7–6.1)
RBC # BLD: 4.44 M/UL — LOW (ref 4.7–6.1)
RBC # FLD: 13 % — SIGNIFICANT CHANGE UP (ref 11.5–14.5)
RBC # FLD: 13.1 % — SIGNIFICANT CHANGE UP (ref 11.5–14.5)
SODIUM SERPL-SCNC: 141 MMOL/L — SIGNIFICANT CHANGE UP (ref 135–146)
SODIUM SERPL-SCNC: 141 MMOL/L — SIGNIFICANT CHANGE UP (ref 135–146)
WBC # BLD: 6.43 K/UL — SIGNIFICANT CHANGE UP (ref 4.8–10.8)
WBC # BLD: 9.51 K/UL — SIGNIFICANT CHANGE UP (ref 4.8–10.8)
WBC # FLD AUTO: 6.43 K/UL — SIGNIFICANT CHANGE UP (ref 4.8–10.8)
WBC # FLD AUTO: 9.51 K/UL — SIGNIFICANT CHANGE UP (ref 4.8–10.8)

## 2019-08-02 PROCEDURE — 93306 TTE W/DOPPLER COMPLETE: CPT | Mod: 26

## 2019-08-02 PROCEDURE — 93010 ELECTROCARDIOGRAM REPORT: CPT

## 2019-08-02 PROCEDURE — 99222 1ST HOSP IP/OBS MODERATE 55: CPT

## 2019-08-02 RX ORDER — MAGNESIUM SULFATE 500 MG/ML
2 VIAL (ML) INJECTION ONCE
Refills: 0 | Status: COMPLETED | OUTPATIENT
Start: 2019-08-02 | End: 2019-08-02

## 2019-08-02 RX ADMIN — HEPARIN SODIUM 5000 UNIT(S): 5000 INJECTION INTRAVENOUS; SUBCUTANEOUS at 21:20

## 2019-08-02 RX ADMIN — Medication 75 MICROGRAM(S): at 05:27

## 2019-08-02 RX ADMIN — CHLORHEXIDINE GLUCONATE 1 APPLICATION(S): 213 SOLUTION TOPICAL at 05:25

## 2019-08-02 RX ADMIN — HEPARIN SODIUM 5000 UNIT(S): 5000 INJECTION INTRAVENOUS; SUBCUTANEOUS at 13:30

## 2019-08-02 RX ADMIN — HEPARIN SODIUM 5000 UNIT(S): 5000 INJECTION INTRAVENOUS; SUBCUTANEOUS at 05:26

## 2019-08-02 RX ADMIN — LISINOPRIL 40 MILLIGRAM(S): 2.5 TABLET ORAL at 05:27

## 2019-08-02 RX ADMIN — PANTOPRAZOLE SODIUM 40 MILLIGRAM(S): 20 TABLET, DELAYED RELEASE ORAL at 05:27

## 2019-08-02 RX ADMIN — SODIUM CHLORIDE 100 MILLILITER(S): 9 INJECTION INTRAMUSCULAR; INTRAVENOUS; SUBCUTANEOUS at 05:27

## 2019-08-02 RX ADMIN — Medication 50 GRAM(S): at 06:28

## 2019-08-02 NOTE — CONSULT NOTE ADULT - ASSESSMENT
Abd Pain s/p Ventral hernia reduction. Needs preop for Ventral hernia repair  Hx MI s/p CABG x4 2005  Hx DMII and Dyslipidemia    - EKG shows Sinus mila no ST or T wave changes. No complaint of chest pain, SOB, dizziness or palpitation  - Cont Aspirin, Atorvastatin and Lisinopril  - METS > 4  - Pt is intermediate risk going for Low risk Surgery  Will discuss with Cardiology Abd Pain s/p Ventral hernia reduction. Needs preop for Ventral hernia repair  Hx MI s/p CABG x4 2005  Hx DMII and Dyslipidemia    - EKG shows Sinus mila no ST or T wave changes. No complaint of chest pain, SOB, dizziness or palpitation  - Cont Aspirin, Atorvastatin and Lisinopril  - METS > 4  - Pt is intermediate risk going for Low risk Surgery Abd Pain s/p Ventral hernia reduction. Needs preop for Ventral hernia repair  Hx MI s/p CABG x4 2005  Hx DMII and Dyslipidemia    - EKG shows Sinus mila no ST or T wave changes. No complaint of chest pain, SOB, dizziness or palpitation  - Cont Aspirin, Atorvastatin and Lisinopril  - METS > 4  - Pt is intermediate risk going for Low risk Surgery - proceed as planned

## 2019-08-02 NOTE — PROGRESS NOTE ADULT - ASSESSMENT
79 year old male with reducible ventral hernia, CT scan was significant for "Ventral hernia containing a short segment of small bowel as well as adjacent free fluid in the hernia sac. Findings are consistent with a closed loop obstruction with possible superimposed ischemic changes."    Plan:  NPO, IVF  Resume home medications  Observation s/p hernia reduction - no signs of sbo - continues to pass gas and have bowel movements  Repair of hernia this admission if patient agreeable  NG tube if nauseous or begins to vomit

## 2019-08-02 NOTE — PROGRESS NOTE ADULT - SUBJECTIVE AND OBJECTIVE BOX
GENERAL SURGERY PROGRESS NOTE     THERESE ALMANZA  79y  Male  Hospital day :1d  POD:  Procedure:   OVERNIGHT EVENTS:  No pain. Ambulating independently. Wondering when he can go for surgery.    T(F): 96.3 (19 @ 03:50), Max: 96.9 (19 @ 12:51)  HR: 63 (19 @ 03:50) (60 - 64)  BP: 132/67 (19 @ 03:50) (132/67 - 174/84)  ABP: --  ABP(mean): --  RR: 18 (19 @ 03:50) (18 - 20)  SpO2: 95% (19 @ 17:28) (95% - 98%)    DIET/FLUIDS: dextrose 5%. 1000 milliLiter(s) IV Continuous <Continuous>  magnesium sulfate  IVPB 2 Gram(s) IV Intermittent once  sodium chloride 0.9%. 1000 milliLiter(s) IV Continuous <Continuous>    GI proph:  pantoprazole    Tablet 40 milliGRAM(s) Oral before breakfast    AC/ proph: heparin  Injectable 5000 Unit(s) SubCutaneous every 8 hours    ABx:     PHYSICAL EXAM:  GENERAL: NAD, well-appearing  CHEST/LUNG: Clear to auscultation bilaterally  HEART: Regular rate and rhythm  ABDOMEN: Soft, Nontender, Nondistended; midline scar  EXTREMITIES:  No clubbing, cyanosis, or edema      LABS  Labs:  CAPILLARY BLOOD GLUCOSE      POCT Blood Glucose.: 115 mg/dL (02 Aug 2019 00:06)                          13.7   9.51  )-----------( 202      ( 02 Aug 2019 01:17 )             40.5       Auto Immature Granulocyte %: 0.5 % (19 @ 01:17)  Auto Neutrophil %: 69.0 % (19 @ 01:17)  Auto Neutrophil %: 84.0 % (19 @ 14:00)  Auto Immature Granulocyte %: 0.7 % (19 @ 14:00)        141  |  105  |  10  ----------------------------<  117<H>  4.3   |  27  |  1.0      Calcium, Total Serum: 9.3 mg/dL (19 @ 01:17)      LFTs:             6.4  | 0.3  | 19       ------------------[65      ( 01 Aug 2019 14:00 )  3.9  | x    | 27          Lipase:30     Amylase:x         Blood Gas Venous - Lactate: 1.3 mmoL/L (19 @ 18:29)      Coags:     12.50  ----< 1.09    ( 01 Aug 2019 18:55 )     33.5        Urinalysis Basic - ( 01 Aug 2019 20:01 )    Color: Light Yellow / Appearance: Clear / S.043 / pH: x  Gluc: x / Ketone: Negative  / Bili: Negative / Urobili: <2 mg/dL   Blood: x / Protein: Negative / Nitrite: Negative   Leuk Esterase: Negative / RBC: x / WBC x   Sq Epi: x / Non Sq Epi: x / Bacteria: x

## 2019-08-02 NOTE — CONSULT NOTE ADULT - SUBJECTIVE AND OBJECTIVE BOX
CHIEF COMPLAINT:Patient is a 79y old  Male who presents with a chief complaint of     HISTORY OF PRESENT ILLNESS:   HPI:  79 year old male presents with abdominal pain. Patient states he has a chronic hernia that usually gives him pain on and off that resolves but today the pain didnt go away so he came for an evaluation. Patient states he had a colectomy and reversal about 5 years ago, at the time the surgeon told him they were unable to completely fix the hernia. Denies having a colonoscopy after his surgery. (01 Aug 2019 19:58)    Pt has hx of DMII, dyslipidemia  CAD with MI 2005 s/o CABG x4. Last seen cardiology 2 years ago and reports last stress test 2 years ago neg. He denies any chest pain, sob, dizziness or palpitation    PAST MEDICAL & SURGICAL HISTORY:  Hypertension  Diabetes mellitus  Coronary artery disease  History of open heart surgery  H/O inguinal hernia repair  History of partial colectomy    FAMILY HISTORY:    Allergies    Allergy Status Unknown    Intolerances    	  Home Medications:  ATORVASTATIN 80 MG TABLET:  (01 Aug 2019 20:07)  METFORMIN  MG TABLET:  (01 Aug 2019 20:07)  RAMIPRIL 10 MG CAPSULE:  (01 Aug 2019 20:07)  SYNTHROID 75 MCG TABLET:  (01 Aug 2019 20:07)    MEDICATIONS  (STANDING):  atorvastatin 80 milliGRAM(s) Oral at bedtime  chlorhexidine 4% Liquid 1 Application(s) Topical <User Schedule>  dextrose 5%. 1000 milliLiter(s) (50 mL/Hr) IV Continuous <Continuous>  dextrose 50% Injectable 12.5 Gram(s) IV Push once  dextrose 50% Injectable 25 Gram(s) IV Push once  dextrose 50% Injectable 25 Gram(s) IV Push once  heparin  Injectable 5000 Unit(s) SubCutaneous every 8 hours  insulin lispro (HumaLOG) corrective regimen sliding scale   SubCutaneous three times a day before meals  levothyroxine 75 MICROGram(s) Oral daily  lisinopril 40 milliGRAM(s) Oral daily  pantoprazole    Tablet 40 milliGRAM(s) Oral before breakfast  sodium chloride 0.9%. 1000 milliLiter(s) (100 mL/Hr) IV Continuous <Continuous>    MEDICATIONS  (PRN):  dextrose 40% Gel 15 Gram(s) Oral once PRN Blood Glucose LESS THAN 70 milliGRAM(s)/deciliter  glucagon  Injectable 1 milliGRAM(s) IntraMuscular once PRN Glucose LESS THAN 70 milligrams/deciliter        SOCIAL HISTORY:    [  ] active smoker  [ * ] non smoker  [  ] Etoh  [  ] recreational drugs    REVIEW OF SYSTEMS:  14 point ROS negative except as mentioned above in HPI    PHYSICAL EXAM:  T(C): 36.2 (08-02-19 @ 12:25), Max: 36.8 (08-02-19 @ 07:37)  HR: 50 (08-02-19 @ 12:25) (50 - 64)  BP: 147/77 (08-02-19 @ 12:25) (132/67 - 174/84)  RR: 18 (08-02-19 @ 12:25) (18 - 20)  SpO2: 95% (08-02-19 @ 12:25) (95% - 95%)  Wt(kg): --  I&O's Summary      General Appearance: in NAD	  HEENT: NC, No JVD appreciated  Cardiovascular: regular S1 S2, No murmurs rubs or gallops  Respiratory: cta b/l  Gastrointestinal:  Soft, Non-tender, BS +	  Neurologic: No focal deficits, AAOx3  Extremities: ROM wnl, No clubbing/cyanosis/edema  Skin: No rashes, No ecchymoses, No cyanosis  Vascular: Peripheral pulses palpable 2+ bilaterally    LABS:	 	                        13.7   9.51  )-----------( 202      ( 02 Aug 2019 01:17 )             40.5     08-02    141  |  105  |  10  ----------------------------<  117<H>  4.3   |  27  |  1.0    Ca    9.3      02 Aug 2019 01:17  Phos  2.8     08-02  Mg     1.7     08-02    TPro  6.4  /  Alb  3.9  /  TBili  0.3  /  DBili  x   /  AST  19  /  ALT  27  /  AlkPhos  65  08-01    eGFR if Non African American: 72 mL/min/1.73M2 (08-02-19 @ 01:17)      Hemoglobin A1C, Whole Blood: 6.9 % (08-02-19 @ 05:41)        CARDIAC MARKERS:      TELEMETRY EVENTS: 	    ECG:  	Sinus Bradycardia with fusion complex  RADIOLOGY: < from: CT Abdomen and Pelvis w/ Oral Cont and w/ IV Cont (08.01.19 @ 16:39) >  Ventral hernia containing a short segment of small bowel as well as   adjacent free fluid in the hernia sac. Findings are consistent with a   closed loop obstruction with possible superimposed ischemic changes.   Surgical evaluation is recommended.    < end of copied text >    	    PREVIOUS DIAGNOSTIC TESTING:    [ ] Echocardiogram:  [ ] Catheterization:  [ ] Stress Test: CHIEF COMPLAINT:Patient is a 79y old  Male who presents with a chief complaint of     HISTORY OF PRESENT ILLNESS:   HPI:  79 year old male presents with abdominal pain. Patient states he has a chronic hernia that usually gives him pain on and off that resolves but today the pain didnt go away so he came for an evaluation. Patient states he had a colectomy and reversal about 5 years ago, at the time the surgeon told him they were unable to completely fix the hernia. Denies having a colonoscopy after his surgery. (01 Aug 2019 19:58)    Pt has hx of DMII, dyslipidemia  CAD with MI 2005 s/o CABG x4. Last seen cardiology 2 years ago and reports last stress test 2 years ago neg. He denies any chest pain, sob, dizziness or palpitation    PAST MEDICAL & SURGICAL HISTORY:  Hypertension  Diabetes mellitus  Coronary artery disease  History of open heart surgery  H/O inguinal hernia repair  History of partial colectomy    FAMILY HISTORY:    Allergies    Allergy Status Unknown    Intolerances    	  Home Medications:  ATORVASTATIN 80 MG TABLET:  (01 Aug 2019 20:07)  METFORMIN  MG TABLET:  (01 Aug 2019 20:07)  RAMIPRIL 10 MG CAPSULE:  (01 Aug 2019 20:07)  SYNTHROID 75 MCG TABLET:  (01 Aug 2019 20:07)    MEDICATIONS  (STANDING):  atorvastatin 80 milliGRAM(s) Oral at bedtime  chlorhexidine 4% Liquid 1 Application(s) Topical <User Schedule>  dextrose 5%. 1000 milliLiter(s) (50 mL/Hr) IV Continuous <Continuous>  dextrose 50% Injectable 12.5 Gram(s) IV Push once  dextrose 50% Injectable 25 Gram(s) IV Push once  dextrose 50% Injectable 25 Gram(s) IV Push once  heparin  Injectable 5000 Unit(s) SubCutaneous every 8 hours  insulin lispro (HumaLOG) corrective regimen sliding scale   SubCutaneous three times a day before meals  levothyroxine 75 MICROGram(s) Oral daily  lisinopril 40 milliGRAM(s) Oral daily  pantoprazole    Tablet 40 milliGRAM(s) Oral before breakfast  sodium chloride 0.9%. 1000 milliLiter(s) (100 mL/Hr) IV Continuous <Continuous>    MEDICATIONS  (PRN):  dextrose 40% Gel 15 Gram(s) Oral once PRN Blood Glucose LESS THAN 70 milliGRAM(s)/deciliter  glucagon  Injectable 1 milliGRAM(s) IntraMuscular once PRN Glucose LESS THAN 70 milligrams/deciliter        SOCIAL HISTORY:    [  ] active smoker  [ * ] non smoker  [  ] Etoh  [  ] recreational drugs    REVIEW OF SYSTEMS:  14 point ROS negative except as mentioned above in HPI    PHYSICAL EXAM:  T(C): 36.2 (08-02-19 @ 12:25), Max: 36.8 (08-02-19 @ 07:37)  HR: 50 (08-02-19 @ 12:25) (50 - 64)  BP: 147/77 (08-02-19 @ 12:25) (132/67 - 174/84)  RR: 18 (08-02-19 @ 12:25) (18 - 20)  SpO2: 95% (08-02-19 @ 12:25) (95% - 95%)  Wt(kg): --  I&O's Summary      General Appearance: in NAD	  HEENT: NC, No JVD appreciated  Cardiovascular: regular S1 S2, No murmurs rubs or gallops  Respiratory: cta b/l  Gastrointestinal:  Soft, Non-tender, BS +	  Neurologic: No focal deficits, AAOx3  Extremities/MS: ROM wnl, No clubbing/cyanosis/edema  Skin: No rashes, No ecchymoses, No cyanosis  Vascular: Peripheral pulses palpable 2+ bilaterally    LABS:	 	                        13.7   9.51  )-----------( 202      ( 02 Aug 2019 01:17 )             40.5     08-02    141  |  105  |  10  ----------------------------<  117<H>  4.3   |  27  |  1.0    Ca    9.3      02 Aug 2019 01:17  Phos  2.8     08-02  Mg     1.7     08-02    TPro  6.4  /  Alb  3.9  /  TBili  0.3  /  DBili  x   /  AST  19  /  ALT  27  /  AlkPhos  65  08-01    eGFR if Non African American: 72 mL/min/1.73M2 (08-02-19 @ 01:17)      Hemoglobin A1C, Whole Blood: 6.9 % (08-02-19 @ 05:41)        CARDIAC MARKERS:      TELEMETRY EVENTS: 	    ECG:  	Sinus Bradycardia with fusion complex  RADIOLOGY: < from: CT Abdomen and Pelvis w/ Oral Cont and w/ IV Cont (08.01.19 @ 16:39) >  Ventral hernia containing a short segment of small bowel as well as   adjacent free fluid in the hernia sac. Findings are consistent with a   closed loop obstruction with possible superimposed ischemic changes.   Surgical evaluation is recommended.    < end of copied text >    	    PREVIOUS DIAGNOSTIC TESTING:    [ ] Echocardiogram:  [ ] Catheterization:  [ ] Stress Test:

## 2019-08-03 LAB
ANION GAP SERPL CALC-SCNC: 13 MMOL/L — SIGNIFICANT CHANGE UP (ref 7–14)
BASOPHILS # BLD AUTO: 0.03 K/UL — SIGNIFICANT CHANGE UP (ref 0–0.2)
BASOPHILS NFR BLD AUTO: 0.5 % — SIGNIFICANT CHANGE UP (ref 0–1)
BUN SERPL-MCNC: 15 MG/DL — SIGNIFICANT CHANGE UP (ref 10–20)
CALCIUM SERPL-MCNC: 9.5 MG/DL — SIGNIFICANT CHANGE UP (ref 8.5–10.1)
CHLORIDE SERPL-SCNC: 103 MMOL/L — SIGNIFICANT CHANGE UP (ref 98–110)
CO2 SERPL-SCNC: 25 MMOL/L — SIGNIFICANT CHANGE UP (ref 17–32)
CREAT SERPL-MCNC: 1.2 MG/DL — SIGNIFICANT CHANGE UP (ref 0.7–1.5)
EOSINOPHIL # BLD AUTO: 0.25 K/UL — SIGNIFICANT CHANGE UP (ref 0–0.7)
EOSINOPHIL NFR BLD AUTO: 3.9 % — SIGNIFICANT CHANGE UP (ref 0–8)
GLUCOSE BLDC GLUCOMTR-MCNC: 105 MG/DL — HIGH (ref 70–99)
GLUCOSE BLDC GLUCOMTR-MCNC: 117 MG/DL — HIGH (ref 70–99)
GLUCOSE BLDC GLUCOMTR-MCNC: 225 MG/DL — HIGH (ref 70–99)
GLUCOSE BLDC GLUCOMTR-MCNC: 90 MG/DL — SIGNIFICANT CHANGE UP (ref 70–99)
GLUCOSE BLDC GLUCOMTR-MCNC: 99 MG/DL — SIGNIFICANT CHANGE UP (ref 70–99)
GLUCOSE SERPL-MCNC: 122 MG/DL — HIGH (ref 70–99)
HCT VFR BLD CALC: 39.6 % — LOW (ref 42–52)
HGB BLD-MCNC: 13.3 G/DL — LOW (ref 14–18)
IMM GRANULOCYTES NFR BLD AUTO: 0.5 % — HIGH (ref 0.1–0.3)
LYMPHOCYTES # BLD AUTO: 1.58 K/UL — SIGNIFICANT CHANGE UP (ref 1.2–3.4)
LYMPHOCYTES # BLD AUTO: 24.9 % — SIGNIFICANT CHANGE UP (ref 20.5–51.1)
MAGNESIUM SERPL-MCNC: 2.1 MG/DL — SIGNIFICANT CHANGE UP (ref 1.8–2.4)
MCHC RBC-ENTMCNC: 30.7 PG — SIGNIFICANT CHANGE UP (ref 27–31)
MCHC RBC-ENTMCNC: 33.6 G/DL — SIGNIFICANT CHANGE UP (ref 32–37)
MCV RBC AUTO: 91.5 FL — SIGNIFICANT CHANGE UP (ref 80–94)
MONOCYTES # BLD AUTO: 0.61 K/UL — HIGH (ref 0.1–0.6)
MONOCYTES NFR BLD AUTO: 9.6 % — HIGH (ref 1.7–9.3)
NEUTROPHILS # BLD AUTO: 3.84 K/UL — SIGNIFICANT CHANGE UP (ref 1.4–6.5)
NEUTROPHILS NFR BLD AUTO: 60.6 % — SIGNIFICANT CHANGE UP (ref 42.2–75.2)
NRBC # BLD: 0 /100 WBCS — SIGNIFICANT CHANGE UP (ref 0–0)
PHOSPHATE SERPL-MCNC: 3.4 MG/DL — SIGNIFICANT CHANGE UP (ref 2.1–4.9)
PLATELET # BLD AUTO: 216 K/UL — SIGNIFICANT CHANGE UP (ref 130–400)
POTASSIUM SERPL-MCNC: 3.9 MMOL/L — SIGNIFICANT CHANGE UP (ref 3.5–5)
POTASSIUM SERPL-SCNC: 3.9 MMOL/L — SIGNIFICANT CHANGE UP (ref 3.5–5)
RBC # BLD: 4.33 M/UL — LOW (ref 4.7–6.1)
RBC # FLD: 12.9 % — SIGNIFICANT CHANGE UP (ref 11.5–14.5)
SODIUM SERPL-SCNC: 141 MMOL/L — SIGNIFICANT CHANGE UP (ref 135–146)
WBC # BLD: 6.34 K/UL — SIGNIFICANT CHANGE UP (ref 4.8–10.8)
WBC # FLD AUTO: 6.34 K/UL — SIGNIFICANT CHANGE UP (ref 4.8–10.8)

## 2019-08-03 PROCEDURE — 99231 SBSQ HOSP IP/OBS SF/LOW 25: CPT

## 2019-08-03 RX ADMIN — ATORVASTATIN CALCIUM 80 MILLIGRAM(S): 80 TABLET, FILM COATED ORAL at 21:39

## 2019-08-03 RX ADMIN — HEPARIN SODIUM 5000 UNIT(S): 5000 INJECTION INTRAVENOUS; SUBCUTANEOUS at 21:39

## 2019-08-03 RX ADMIN — Medication 85 MILLIMOLE(S): at 04:02

## 2019-08-03 RX ADMIN — HEPARIN SODIUM 5000 UNIT(S): 5000 INJECTION INTRAVENOUS; SUBCUTANEOUS at 06:10

## 2019-08-03 RX ADMIN — SODIUM CHLORIDE 100 MILLILITER(S): 9 INJECTION INTRAMUSCULAR; INTRAVENOUS; SUBCUTANEOUS at 23:37

## 2019-08-03 RX ADMIN — Medication 4: at 12:22

## 2019-08-03 RX ADMIN — HEPARIN SODIUM 5000 UNIT(S): 5000 INJECTION INTRAVENOUS; SUBCUTANEOUS at 13:43

## 2019-08-03 NOTE — PROGRESS NOTE ADULT - SUBJECTIVE AND OBJECTIVE BOX
GENERAL SURGERY PROGRESS NOTE     THERESE ALMANZA  79y  Male  Hospital day :2d  POD:  Procedure:     OVERNIGHT EVENTS:  No acute overnight events.     T(F): 98.7 (19 @ 00:00), Max: 98.7 (19 @ 00:00)  HR: 47 (19 @ 00:00) (47 - 53)  BP: 142/67 (19 @ 00:00) (135/63 - 163/70)  ABP: --  ABP(mean): --  RR: 18 (19 @ 00:00) (18 - 18)  SpO2: 95% (19 @ 12:25) (95% - 95%)    DIET/FLUIDS: dextrose 5%. 1000 milliLiter(s) IV Continuous <Continuous>  sodium chloride 0.9%. 1000 milliLiter(s) IV Continuous <Continuous>    GI proph:  pantoprazole    Tablet 40 milliGRAM(s) Oral before breakfast    AC/ proph: heparin  Injectable 5000 Unit(s) SubCutaneous every 8 hours    ABx:     PHYSICAL EXAM:  GENERAL: NAD, well-appearing  CHEST/LUNG: Clear to auscultation bilaterally  HEART: Regular rate and rhythm  ABDOMEN: Soft, Nontender, Nondistended;   EXTREMITIES:  No clubbing, cyanosis, or edema      LABS  Labs:  CAPILLARY BLOOD GLUCOSE      POCT Blood Glucose.: 90 mg/dL (02 Aug 2019 23:59)  POCT Blood Glucose.: 109 mg/dL (02 Aug 2019 17:20)  POCT Blood Glucose.: 124 mg/dL (02 Aug 2019 12:15)  POCT Blood Glucose.: 125 mg/dL (02 Aug 2019 06:17)                          12.9   6.43  )-----------( 199      ( 02 Aug 2019 21:46 )             38.0       Auto Immature Granulocyte %: 0.3 % (19 @ 21:46)  Auto Neutrophil %: 61.9 % (19 @ 21:46)        141  |  104  |  9<L>  ----------------------------<  102<H>  4.0   |  27  |  1.0      Calcium, Total Serum: 8.7 mg/dL (19 @ 21:46)      LFTs:             6.4  | 0.3  | 19       ------------------[65      ( 01 Aug 2019 14:00 )  3.9  | x    | 27          Lipase:30     Amylase:x         Blood Gas Venous - Lactate: 1.3 mmoL/L (19 @ 18:29)      Coags:     12.50  ----< 1.09    ( 01 Aug 2019 18:55 )     33.5       Urinalysis Basic - ( 01 Aug 2019 20:01 )    Color: Light Yellow / Appearance: Clear / S.043 / pH: x  Gluc: x / Ketone: Negative  / Bili: Negative / Urobili: <2 mg/dL   Blood: x / Protein: Negative / Nitrite: Negative   Leuk Esterase: Negative / RBC: x / WBC x   Sq Epi: x / Non Sq Epi: x / Bacteria: x

## 2019-08-03 NOTE — PROGRESS NOTE ADULT - ASSESSMENT
79 year old male with reducible ventral hernia, CT scan was significant for "Ventral hernia containing a short segment of small bowel as well as adjacent free fluid in the hernia sac. Findings are consistent with a closed loop obstruction with possible superimposed ischemic changes."    Plan:  NPO, IVF  On schedule for today --> follow up hospitalist consult  Resume home medications --> figure out IV meds vs. NPO w/ meds  Observation s/p hernia reduction - no signs of sbo - continues to pass gas and have bowel movements

## 2019-08-03 NOTE — PRE-OP CHECKLIST - SELECT TESTS ORDERED
PT/PTT/CBC/INR/CT, echo/BMP/Urinalysis/Type and Cross/Type and Screen/CXR/POCT Blood Glucose BMP/CT, echo, fingerstick 123/INR/Urinalysis/PT/PTT/Type and Cross/Type and Screen/CBC/POCT Blood Glucose/CXR

## 2019-08-04 LAB
ANION GAP SERPL CALC-SCNC: 11 MMOL/L — SIGNIFICANT CHANGE UP (ref 7–14)
BASOPHILS # BLD AUTO: 0.01 K/UL — SIGNIFICANT CHANGE UP (ref 0–0.2)
BASOPHILS NFR BLD AUTO: 0.1 % — SIGNIFICANT CHANGE UP (ref 0–1)
BUN SERPL-MCNC: 12 MG/DL — SIGNIFICANT CHANGE UP (ref 10–20)
CALCIUM SERPL-MCNC: 9.1 MG/DL — SIGNIFICANT CHANGE UP (ref 8.5–10.1)
CHLORIDE SERPL-SCNC: 106 MMOL/L — SIGNIFICANT CHANGE UP (ref 98–110)
CO2 SERPL-SCNC: 24 MMOL/L — SIGNIFICANT CHANGE UP (ref 17–32)
CREAT SERPL-MCNC: 1.1 MG/DL — SIGNIFICANT CHANGE UP (ref 0.7–1.5)
EOSINOPHIL # BLD AUTO: 0.01 K/UL — SIGNIFICANT CHANGE UP (ref 0–0.7)
EOSINOPHIL NFR BLD AUTO: 0.1 % — SIGNIFICANT CHANGE UP (ref 0–8)
GLUCOSE BLDC GLUCOMTR-MCNC: 112 MG/DL — HIGH (ref 70–99)
GLUCOSE BLDC GLUCOMTR-MCNC: 123 MG/DL — HIGH (ref 70–99)
GLUCOSE BLDC GLUCOMTR-MCNC: 154 MG/DL — HIGH (ref 70–99)
GLUCOSE SERPL-MCNC: 167 MG/DL — HIGH (ref 70–99)
HCT VFR BLD CALC: 43.1 % — SIGNIFICANT CHANGE UP (ref 42–52)
HGB BLD-MCNC: 14.5 G/DL — SIGNIFICANT CHANGE UP (ref 14–18)
IMM GRANULOCYTES NFR BLD AUTO: 0.4 % — HIGH (ref 0.1–0.3)
LYMPHOCYTES # BLD AUTO: 0.66 K/UL — LOW (ref 1.2–3.4)
LYMPHOCYTES # BLD AUTO: 5.8 % — LOW (ref 20.5–51.1)
MAGNESIUM SERPL-MCNC: 1.7 MG/DL — LOW (ref 1.8–2.4)
MCHC RBC-ENTMCNC: 30.9 PG — SIGNIFICANT CHANGE UP (ref 27–31)
MCHC RBC-ENTMCNC: 33.6 G/DL — SIGNIFICANT CHANGE UP (ref 32–37)
MCV RBC AUTO: 91.9 FL — SIGNIFICANT CHANGE UP (ref 80–94)
MONOCYTES # BLD AUTO: 0.47 K/UL — SIGNIFICANT CHANGE UP (ref 0.1–0.6)
MONOCYTES NFR BLD AUTO: 4.2 % — SIGNIFICANT CHANGE UP (ref 1.7–9.3)
NEUTROPHILS # BLD AUTO: 10.11 K/UL — HIGH (ref 1.4–6.5)
NEUTROPHILS NFR BLD AUTO: 89.4 % — HIGH (ref 42.2–75.2)
NRBC # BLD: 0 /100 WBCS — SIGNIFICANT CHANGE UP (ref 0–0)
PHOSPHATE SERPL-MCNC: 3.4 MG/DL — SIGNIFICANT CHANGE UP (ref 2.1–4.9)
PLATELET # BLD AUTO: 211 K/UL — SIGNIFICANT CHANGE UP (ref 130–400)
POTASSIUM SERPL-MCNC: 4 MMOL/L — SIGNIFICANT CHANGE UP (ref 3.5–5)
POTASSIUM SERPL-SCNC: 4 MMOL/L — SIGNIFICANT CHANGE UP (ref 3.5–5)
RBC # BLD: 4.69 M/UL — LOW (ref 4.7–6.1)
RBC # FLD: 13 % — SIGNIFICANT CHANGE UP (ref 11.5–14.5)
SODIUM SERPL-SCNC: 141 MMOL/L — SIGNIFICANT CHANGE UP (ref 135–146)
WBC # BLD: 11.3 K/UL — HIGH (ref 4.8–10.8)
WBC # FLD AUTO: 11.3 K/UL — HIGH (ref 4.8–10.8)

## 2019-08-04 PROCEDURE — 49561: CPT

## 2019-08-04 PROCEDURE — 49568: CPT

## 2019-08-04 PROCEDURE — 99231 SBSQ HOSP IP/OBS SF/LOW 25: CPT | Mod: 25

## 2019-08-04 RX ORDER — DEXTROSE 50 % IN WATER 50 %
15 SYRINGE (ML) INTRAVENOUS ONCE
Refills: 0 | Status: DISCONTINUED | OUTPATIENT
Start: 2019-08-04 | End: 2019-08-06

## 2019-08-04 RX ORDER — KETOROLAC TROMETHAMINE 30 MG/ML
30 SYRINGE (ML) INJECTION ONCE
Refills: 0 | Status: DISCONTINUED | OUTPATIENT
Start: 2019-08-04 | End: 2019-08-04

## 2019-08-04 RX ORDER — LEVOTHYROXINE SODIUM 125 MCG
75 TABLET ORAL DAILY
Refills: 0 | Status: DISCONTINUED | OUTPATIENT
Start: 2019-08-04 | End: 2019-08-06

## 2019-08-04 RX ORDER — PANTOPRAZOLE SODIUM 20 MG/1
40 TABLET, DELAYED RELEASE ORAL
Refills: 0 | Status: DISCONTINUED | OUTPATIENT
Start: 2019-08-04 | End: 2019-08-06

## 2019-08-04 RX ORDER — MORPHINE SULFATE 50 MG/1
2 CAPSULE, EXTENDED RELEASE ORAL EVERY 4 HOURS
Refills: 0 | Status: DISCONTINUED | OUTPATIENT
Start: 2019-08-04 | End: 2019-08-05

## 2019-08-04 RX ORDER — HEPARIN SODIUM 5000 [USP'U]/ML
5000 INJECTION INTRAVENOUS; SUBCUTANEOUS EVERY 8 HOURS
Refills: 0 | Status: DISCONTINUED | OUTPATIENT
Start: 2019-08-04 | End: 2019-08-06

## 2019-08-04 RX ORDER — ACETAMINOPHEN 500 MG
650 TABLET ORAL EVERY 6 HOURS
Refills: 0 | Status: DISCONTINUED | OUTPATIENT
Start: 2019-08-04 | End: 2019-08-06

## 2019-08-04 RX ORDER — MORPHINE SULFATE 50 MG/1
2 CAPSULE, EXTENDED RELEASE ORAL ONCE
Refills: 0 | Status: DISCONTINUED | OUTPATIENT
Start: 2019-08-04 | End: 2019-08-04

## 2019-08-04 RX ORDER — GLUCAGON INJECTION, SOLUTION 0.5 MG/.1ML
1 INJECTION, SOLUTION SUBCUTANEOUS ONCE
Refills: 0 | Status: DISCONTINUED | OUTPATIENT
Start: 2019-08-04 | End: 2019-08-06

## 2019-08-04 RX ORDER — HYDROMORPHONE HYDROCHLORIDE 2 MG/ML
0.5 INJECTION INTRAMUSCULAR; INTRAVENOUS; SUBCUTANEOUS
Refills: 0 | Status: DISCONTINUED | OUTPATIENT
Start: 2019-08-04 | End: 2019-08-05

## 2019-08-04 RX ORDER — LISINOPRIL 2.5 MG/1
40 TABLET ORAL DAILY
Refills: 0 | Status: DISCONTINUED | OUTPATIENT
Start: 2019-08-04 | End: 2019-08-06

## 2019-08-04 RX ORDER — SODIUM CHLORIDE 9 MG/ML
1000 INJECTION, SOLUTION INTRAVENOUS
Refills: 0 | Status: DISCONTINUED | OUTPATIENT
Start: 2019-08-04 | End: 2019-08-06

## 2019-08-04 RX ORDER — SODIUM CHLORIDE 9 MG/ML
1000 INJECTION INTRAMUSCULAR; INTRAVENOUS; SUBCUTANEOUS
Refills: 0 | Status: DISCONTINUED | OUTPATIENT
Start: 2019-08-04 | End: 2019-08-05

## 2019-08-04 RX ORDER — CHLORHEXIDINE GLUCONATE 213 G/1000ML
1 SOLUTION TOPICAL
Refills: 0 | Status: DISCONTINUED | OUTPATIENT
Start: 2019-08-04 | End: 2019-08-06

## 2019-08-04 RX ORDER — DEXTROSE 50 % IN WATER 50 %
25 SYRINGE (ML) INTRAVENOUS ONCE
Refills: 0 | Status: DISCONTINUED | OUTPATIENT
Start: 2019-08-04 | End: 2019-08-06

## 2019-08-04 RX ORDER — DEXTROSE 50 % IN WATER 50 %
12.5 SYRINGE (ML) INTRAVENOUS ONCE
Refills: 0 | Status: DISCONTINUED | OUTPATIENT
Start: 2019-08-04 | End: 2019-08-06

## 2019-08-04 RX ORDER — ATORVASTATIN CALCIUM 80 MG/1
80 TABLET, FILM COATED ORAL AT BEDTIME
Refills: 0 | Status: DISCONTINUED | OUTPATIENT
Start: 2019-08-04 | End: 2019-08-06

## 2019-08-04 RX ORDER — SODIUM CHLORIDE 9 MG/ML
1000 INJECTION, SOLUTION INTRAVENOUS
Refills: 0 | Status: DISCONTINUED | OUTPATIENT
Start: 2019-08-04 | End: 2019-08-05

## 2019-08-04 RX ORDER — MEPERIDINE HYDROCHLORIDE 50 MG/ML
12.5 INJECTION INTRAMUSCULAR; INTRAVENOUS; SUBCUTANEOUS
Refills: 0 | Status: DISCONTINUED | OUTPATIENT
Start: 2019-08-04 | End: 2019-08-05

## 2019-08-04 RX ORDER — HYDROMORPHONE HYDROCHLORIDE 2 MG/ML
1 INJECTION INTRAMUSCULAR; INTRAVENOUS; SUBCUTANEOUS
Refills: 0 | Status: DISCONTINUED | OUTPATIENT
Start: 2019-08-04 | End: 2019-08-05

## 2019-08-04 RX ORDER — INSULIN LISPRO 100/ML
VIAL (ML) SUBCUTANEOUS
Refills: 0 | Status: DISCONTINUED | OUTPATIENT
Start: 2019-08-04 | End: 2019-08-06

## 2019-08-04 RX ORDER — ONDANSETRON 8 MG/1
4 TABLET, FILM COATED ORAL ONCE
Refills: 0 | Status: DISCONTINUED | OUTPATIENT
Start: 2019-08-04 | End: 2019-08-05

## 2019-08-04 RX ADMIN — HYDROMORPHONE HYDROCHLORIDE 1 MILLIGRAM(S): 2 INJECTION INTRAMUSCULAR; INTRAVENOUS; SUBCUTANEOUS at 19:48

## 2019-08-04 RX ADMIN — MORPHINE SULFATE 2 MILLIGRAM(S): 50 CAPSULE, EXTENDED RELEASE ORAL at 07:40

## 2019-08-04 RX ADMIN — Medication 30 MILLIGRAM(S): at 20:29

## 2019-08-04 RX ADMIN — HYDROMORPHONE HYDROCHLORIDE 1 MILLIGRAM(S): 2 INJECTION INTRAMUSCULAR; INTRAVENOUS; SUBCUTANEOUS at 19:33

## 2019-08-04 RX ADMIN — HYDROMORPHONE HYDROCHLORIDE 1 MILLIGRAM(S): 2 INJECTION INTRAMUSCULAR; INTRAVENOUS; SUBCUTANEOUS at 19:15

## 2019-08-04 RX ADMIN — HEPARIN SODIUM 5000 UNIT(S): 5000 INJECTION INTRAVENOUS; SUBCUTANEOUS at 05:35

## 2019-08-04 RX ADMIN — SODIUM CHLORIDE 125 MILLILITER(S): 9 INJECTION, SOLUTION INTRAVENOUS at 18:12

## 2019-08-04 RX ADMIN — HEPARIN SODIUM 5000 UNIT(S): 5000 INJECTION INTRAVENOUS; SUBCUTANEOUS at 15:03

## 2019-08-04 RX ADMIN — HYDROMORPHONE HYDROCHLORIDE 1 MILLIGRAM(S): 2 INJECTION INTRAMUSCULAR; INTRAVENOUS; SUBCUTANEOUS at 20:03

## 2019-08-04 RX ADMIN — HYDROMORPHONE HYDROCHLORIDE 1 MILLIGRAM(S): 2 INJECTION INTRAMUSCULAR; INTRAVENOUS; SUBCUTANEOUS at 18:00

## 2019-08-04 RX ADMIN — HYDROMORPHONE HYDROCHLORIDE 0.5 MILLIGRAM(S): 2 INJECTION INTRAMUSCULAR; INTRAVENOUS; SUBCUTANEOUS at 21:30

## 2019-08-04 RX ADMIN — LISINOPRIL 40 MILLIGRAM(S): 2.5 TABLET ORAL at 05:35

## 2019-08-04 RX ADMIN — PANTOPRAZOLE SODIUM 40 MILLIGRAM(S): 20 TABLET, DELAYED RELEASE ORAL at 05:35

## 2019-08-04 RX ADMIN — SODIUM CHLORIDE 100 MILLILITER(S): 9 INJECTION INTRAMUSCULAR; INTRAVENOUS; SUBCUTANEOUS at 21:50

## 2019-08-04 RX ADMIN — Medication 30 MILLIGRAM(S): at 20:14

## 2019-08-04 RX ADMIN — Medication 650 MILLIGRAM(S): at 20:15

## 2019-08-04 RX ADMIN — HYDROMORPHONE HYDROCHLORIDE 0.5 MILLIGRAM(S): 2 INJECTION INTRAMUSCULAR; INTRAVENOUS; SUBCUTANEOUS at 21:15

## 2019-08-04 RX ADMIN — Medication 650 MILLIGRAM(S): at 20:45

## 2019-08-04 RX ADMIN — HEPARIN SODIUM 5000 UNIT(S): 5000 INJECTION INTRAVENOUS; SUBCUTANEOUS at 21:26

## 2019-08-04 RX ADMIN — ATORVASTATIN CALCIUM 80 MILLIGRAM(S): 80 TABLET, FILM COATED ORAL at 21:43

## 2019-08-04 RX ADMIN — Medication 75 MICROGRAM(S): at 05:35

## 2019-08-04 NOTE — PROGRESS NOTE ADULT - ASSESSMENT
79 year old male with reducible ventral hernia, CT scan was significant for "Ventral hernia containing a short segment of small bowel as well as adjacent free fluid in the hernia sac. Findings are consistent with a closed loop obstruction with possible superimposed ischemic changes."    Plan:  NPO, IVF  On schedule for today   Resume home medications --> figure out IV meds vs. NPO w/ meds  Observation s/p hernia reduction - no signs of sbo - continues to pass gas and have bowel movements

## 2019-08-04 NOTE — CHART NOTE - NSCHARTNOTEFT_GEN_A_CORE
PACU ANESTHESIA ADMISSION NOTE      Procedure: Incisional hernia repair with mesh    Post op diagnosis:  Ventral incisional hernia      ____  Intubated  TV:______       Rate: ______      FiO2: ______    __x__  Patent Airway    _x___  Full return of protective reflexes    ___x_  Full recovery from anesthesia / back to baseline status    Vitals:  T(F): 98.5 (08-04-19 @ 17:50), Max: 36.8 (08-04-19 @ 00:00)  HR: 71 (08-04-19 @ 17:50) (42 - 56)  BP: 189/82 (08-04-19 @ 17:50) (129/60 - 415/70)  RR: 24 (08-04-19 @ 17:50) (18 - 18)  SpO2:98%    Mental Status:  __x__ Awake   ____x_ Alert   _____ Drowsy   _____ Sedated    Nausea/Vomiting:  __x__ NO  ______Yes,   See Post - Op Orders          Pain Scale (0-10):  ___5__    Treatment: ____ None    ___x_ See Post - Op/PCA Orders    Post - Operative Fluids:   ____ Oral   __x__ See Post - Op Orders    Plan: Discharge:   ____Home       __x___Floor     _____Critical Care    _____  Other:_________________    Comments: Transferred care to PACU; discharge to floor when criteria met.

## 2019-08-05 LAB
GLUCOSE BLDC GLUCOMTR-MCNC: 113 MG/DL — HIGH (ref 70–99)
GLUCOSE BLDC GLUCOMTR-MCNC: 131 MG/DL — HIGH (ref 70–99)
GLUCOSE BLDC GLUCOMTR-MCNC: 160 MG/DL — HIGH (ref 70–99)
GLUCOSE BLDC GLUCOMTR-MCNC: 171 MG/DL — HIGH (ref 70–99)

## 2019-08-05 PROCEDURE — 99024 POSTOP FOLLOW-UP VISIT: CPT

## 2019-08-05 RX ORDER — MAGNESIUM SULFATE 500 MG/ML
2 VIAL (ML) INJECTION ONCE
Refills: 0 | Status: COMPLETED | OUTPATIENT
Start: 2019-08-05 | End: 2019-08-05

## 2019-08-05 RX ORDER — MORPHINE SULFATE 50 MG/1
2 CAPSULE, EXTENDED RELEASE ORAL ONCE
Refills: 0 | Status: DISCONTINUED | OUTPATIENT
Start: 2019-08-05 | End: 2019-08-05

## 2019-08-05 RX ORDER — OXYCODONE HYDROCHLORIDE 5 MG/1
5 TABLET ORAL EVERY 4 HOURS
Refills: 0 | Status: DISCONTINUED | OUTPATIENT
Start: 2019-08-05 | End: 2019-08-06

## 2019-08-05 RX ORDER — ONDANSETRON 8 MG/1
4 TABLET, FILM COATED ORAL ONCE
Refills: 0 | Status: COMPLETED | OUTPATIENT
Start: 2019-08-05 | End: 2019-08-05

## 2019-08-05 RX ADMIN — Medication 50 GRAM(S): at 00:47

## 2019-08-05 RX ADMIN — MORPHINE SULFATE 2 MILLIGRAM(S): 50 CAPSULE, EXTENDED RELEASE ORAL at 23:43

## 2019-08-05 RX ADMIN — LISINOPRIL 40 MILLIGRAM(S): 2.5 TABLET ORAL at 05:29

## 2019-08-05 RX ADMIN — OXYCODONE HYDROCHLORIDE 5 MILLIGRAM(S): 5 TABLET ORAL at 14:17

## 2019-08-05 RX ADMIN — ATORVASTATIN CALCIUM 80 MILLIGRAM(S): 80 TABLET, FILM COATED ORAL at 21:27

## 2019-08-05 RX ADMIN — Medication 650 MILLIGRAM(S): at 11:33

## 2019-08-05 RX ADMIN — OXYCODONE HYDROCHLORIDE 5 MILLIGRAM(S): 5 TABLET ORAL at 14:47

## 2019-08-05 RX ADMIN — PANTOPRAZOLE SODIUM 40 MILLIGRAM(S): 20 TABLET, DELAYED RELEASE ORAL at 05:29

## 2019-08-05 RX ADMIN — Medication 650 MILLIGRAM(S): at 00:04

## 2019-08-05 RX ADMIN — Medication 2: at 12:13

## 2019-08-05 RX ADMIN — ONDANSETRON 4 MILLIGRAM(S): 8 TABLET, FILM COATED ORAL at 22:13

## 2019-08-05 RX ADMIN — HEPARIN SODIUM 5000 UNIT(S): 5000 INJECTION INTRAVENOUS; SUBCUTANEOUS at 14:17

## 2019-08-05 RX ADMIN — Medication 75 MICROGRAM(S): at 05:29

## 2019-08-05 RX ADMIN — OXYCODONE HYDROCHLORIDE 5 MILLIGRAM(S): 5 TABLET ORAL at 22:15

## 2019-08-05 RX ADMIN — HEPARIN SODIUM 5000 UNIT(S): 5000 INJECTION INTRAVENOUS; SUBCUTANEOUS at 21:28

## 2019-08-05 RX ADMIN — Medication 650 MILLIGRAM(S): at 23:51

## 2019-08-05 RX ADMIN — Medication 650 MILLIGRAM(S): at 05:28

## 2019-08-05 RX ADMIN — OXYCODONE HYDROCHLORIDE 5 MILLIGRAM(S): 5 TABLET ORAL at 22:44

## 2019-08-05 RX ADMIN — HEPARIN SODIUM 5000 UNIT(S): 5000 INJECTION INTRAVENOUS; SUBCUTANEOUS at 05:29

## 2019-08-05 RX ADMIN — Medication 650 MILLIGRAM(S): at 17:33

## 2019-08-05 RX ADMIN — MORPHINE SULFATE 2 MILLIGRAM(S): 50 CAPSULE, EXTENDED RELEASE ORAL at 22:43

## 2019-08-05 RX ADMIN — Medication 650 MILLIGRAM(S): at 17:32

## 2019-08-05 RX ADMIN — Medication 650 MILLIGRAM(S): at 11:03

## 2019-08-05 NOTE — PROGRESS NOTE ADULT - ASSESSMENT
80 y/o male s/p ventral incisional hernia repair with mesh     Plan:   - Pain control   - monitor BP   - IS   - Encourage ambulation   - DVT prophylaxis   - Advance diet as tolerated 78 y/o male s/p ventral incisional hernia repair with mesh     Plan:   - Pain control   - monitor BP   - IS   - Encourage ambulation   - DVT prophylaxis   - Advance diet as tolerated   - replete Mg2+

## 2019-08-05 NOTE — PROGRESS NOTE ADULT - SUBJECTIVE AND OBJECTIVE BOX
GENERAL SURGERY PROGRESS NOTE     THERESE ALMANZA  79y  Male  Hospital day :4d  POD:  Procedure: Incisional hernia repair with mesh    OVERNIGHT EVENTS: BP in high 180s, possibly due to pain     T(F): 96.4 (08-04-19 @ 23:00), Max: 98.6 (08-04-19 @ 20:50)  HR: 77 (08-04-19 @ 23:00) (42 - 77)  BP: 189/85 (08-04-19 @ 23:00) (136/80 - 222/95)  ABP: --  ABP(mean): --  RR: 18 (08-04-19 @ 23:00) (14 - 34)  SpO2: 95% (08-04-19 @ 23:00) (95% - 100%)    DIET/FLUIDS: dextrose 5%. 1000 milliLiter(s) IV Continuous <Continuous>  sodium chloride 0.9%. 1000 milliLiter(s) IV Continuous <Continuous>     GI proph:  pantoprazole    Tablet 40 milliGRAM(s) Oral before breakfast    AC/ proph: heparin  Injectable 5000 Unit(s) SubCutaneous every 8 hours    ABx:     PHYSICAL EXAM:  GENERAL: NAD, well-appearing  CHEST/LUNG: Clear to auscultation bilaterally  HEART: Regular rate and rhythm  ABDOMEN: Soft, Nontender, Nondistended;   EXTREMITIES:  No clubbing, cyanosis, or edema      LABS  Labs:  CAPILLARY BLOOD GLUCOSE      POCT Blood Glucose.: 154 mg/dL (04 Aug 2019 18:11)  POCT Blood Glucose.: 112 mg/dL (04 Aug 2019 13:46)  POCT Blood Glucose.: 123 mg/dL (04 Aug 2019 06:07)                          14.5   11.30 )-----------( 211      ( 04 Aug 2019 20:00 )             43.1       Auto Neutrophil %: 89.4 % (08-04-19 @ 20:00)  Auto Immature Granulocyte %: 0.4 % (08-04-19 @ 20:00)    08-04    141  |  106  |  12  ----------------------------<  167<H>  4.0   |  24  |  1.1      Calcium, Total Serum: 9.1 mg/dL (08-04-19 @ 20:00)                RADIOLOGY & ADDITIONAL TESTS:  < from: CT Abdomen and Pelvis w/ Oral Cont and w/ IV Cont (08.01.19 @ 16:39) >  IMPRESSION:     Ventral hernia containing a short segment of small bowel as well as   adjacent free fluid in the hernia sac. Findings are consistent with a   closed loop obstruction with possible superimposed ischemic changes.   Surgical evaluation is recommended.    < end of copied text > GENERAL SURGERY PROGRESS NOTE     THERESE ALMANZA  79y  Male  Hospital day :4d  POD:  Procedure: Incisional hernia repair with mesh    OVERNIGHT EVENTS: BP in 180s but asymptomatic, reassessed BP an hour after and 137/68. Will monitor.     T(F): 96.4 (08-04-19 @ 23:00), Max: 98.6 (08-04-19 @ 20:50)  HR: 77 (08-04-19 @ 23:00) (42 - 77)  BP: 189/85 (08-04-19 @ 23:00) (136/80 - 222/95)  ABP: --  ABP(mean): --  RR: 18 (08-04-19 @ 23:00) (14 - 34)  SpO2: 95% (08-04-19 @ 23:00) (95% - 100%)    DIET/FLUIDS: dextrose 5%. 1000 milliLiter(s) IV Continuous <Continuous>  sodium chloride 0.9%. 1000 milliLiter(s) IV Continuous <Continuous>     GI proph:  pantoprazole    Tablet 40 milliGRAM(s) Oral before breakfast    AC/ proph: heparin  Injectable 5000 Unit(s) SubCutaneous every 8 hours    ABx:     PHYSICAL EXAM:  GENERAL: NAD, well-appearing  CHEST/LUNG: Clear to auscultation bilaterally  HEART: Regular rate and rhythm  ABDOMEN: Soft, Nontender, Nondistended;   EXTREMITIES:  No clubbing, cyanosis, or edema      LABS  Labs:  CAPILLARY BLOOD GLUCOSE      POCT Blood Glucose.: 154 mg/dL (04 Aug 2019 18:11)  POCT Blood Glucose.: 112 mg/dL (04 Aug 2019 13:46)  POCT Blood Glucose.: 123 mg/dL (04 Aug 2019 06:07)                          14.5   11.30 )-----------( 211      ( 04 Aug 2019 20:00 )             43.1       Auto Neutrophil %: 89.4 % (08-04-19 @ 20:00)  Auto Immature Granulocyte %: 0.4 % (08-04-19 @ 20:00)    08-04    141  |  106  |  12  ----------------------------<  167<H>  4.0   |  24  |  1.1      Calcium, Total Serum: 9.1 mg/dL (08-04-19 @ 20:00)                RADIOLOGY & ADDITIONAL TESTS:  < from: CT Abdomen and Pelvis w/ Oral Cont and w/ IV Cont (08.01.19 @ 16:39) >  IMPRESSION:     Ventral hernia containing a short segment of small bowel as well as   adjacent free fluid in the hernia sac. Findings are consistent with a   closed loop obstruction with possible superimposed ischemic changes.   Surgical evaluation is recommended.    < end of copied text >

## 2019-08-05 NOTE — CHART NOTE - NSCHARTNOTEFT_GEN_A_CORE
Post Operative Check    Patient is post op from a ventral incisional hernia repair with mesh and is doing well. He does not complain of any abdominal pain, nausea, or vomiting. He has passed urine but not gas or BM yet.     Vitals    T(C): 35.8 (08-04-19 @ 23:00), Max: 37 (08-04-19 @ 20:50)  HR: 77 (08-04-19 @ 23:00) (42 - 77)  BP: 189/85 (08-04-19 @ 23:00) (136/80 - 222/95)  RR: 18 (08-04-19 @ 23:00) (14 - 34)  SpO2: 95% (08-04-19 @ 23:00) (95% - 100%)  Wt(kg): --      08-03 @ 07:01  -  08-04 @ 07:00  --------------------------------------------------------  IN:    Oral Fluid: 240 mL    sodium chloride 0.9%: 1100 mL  Total IN: 1340 mL    OUT:    Voided: 750 mL  Total OUT: 750 mL    Total NET: 590 mL      08-04 @ 07:01  -  08-05 @ 00:05  --------------------------------------------------------  IN:    lactated ringers.: 479.2 mL    Oral Fluid: 120 mL    sodium chloride 0.9%.: 116.6 mL  Total IN: 715.8 mL    OUT:    Voided: 700 mL  Total OUT: 700 mL    Total NET: 15.8 mL          Physical Exam  General: NAD AAOx3   Cards: RRR S1S2  Resp: CTAB  Abdomen: soft and mildly tender. Dressing was soaked with blood so it was changed.   : no madhavi   Ext: NTBL    Labs  Labs:  CAPILLARY BLOOD GLUCOSE      POCT Blood Glucose.: 154 mg/dL (04 Aug 2019 18:11)  POCT Blood Glucose.: 112 mg/dL (04 Aug 2019 13:46)  POCT Blood Glucose.: 123 mg/dL (04 Aug 2019 06:07)                          14.5   11.30 )-----------( 211      ( 04 Aug 2019 20:00 )             43.1       Auto Neutrophil %: 89.4 % (08-04-19 @ 20:00)  Auto Immature Granulocyte %: 0.4 % (08-04-19 @ 20:00)    08-04    141  |  106  |  12  ----------------------------<  167<H>  4.0   |  24  |  1.1      Calcium, Total Serum: 9.1 mg/dL (08-04-19 @ 20:00)      Patient is a 79y old Male s/p     Plan:  - Pain management   - IS   - Encourage ambulation   - DVT prophylaxis   - Monitor BP

## 2019-08-06 ENCOUNTER — TRANSCRIPTION ENCOUNTER (OUTPATIENT)
Age: 79
End: 2019-08-06

## 2019-08-06 LAB
ANION GAP SERPL CALC-SCNC: 13 MMOL/L — SIGNIFICANT CHANGE UP (ref 7–14)
BASOPHILS # BLD AUTO: 0.01 K/UL — SIGNIFICANT CHANGE UP (ref 0–0.2)
BASOPHILS NFR BLD AUTO: 0.1 % — SIGNIFICANT CHANGE UP (ref 0–1)
BUN SERPL-MCNC: 12 MG/DL — SIGNIFICANT CHANGE UP (ref 10–20)
CALCIUM SERPL-MCNC: 9.4 MG/DL — SIGNIFICANT CHANGE UP (ref 8.5–10.1)
CHLORIDE SERPL-SCNC: 102 MMOL/L — SIGNIFICANT CHANGE UP (ref 98–110)
CO2 SERPL-SCNC: 24 MMOL/L — SIGNIFICANT CHANGE UP (ref 17–32)
CREAT SERPL-MCNC: 1 MG/DL — SIGNIFICANT CHANGE UP (ref 0.7–1.5)
EOSINOPHIL # BLD AUTO: 0 K/UL — SIGNIFICANT CHANGE UP (ref 0–0.7)
EOSINOPHIL NFR BLD AUTO: 0 % — SIGNIFICANT CHANGE UP (ref 0–8)
GLUCOSE BLDC GLUCOMTR-MCNC: 120 MG/DL — HIGH (ref 70–99)
GLUCOSE BLDC GLUCOMTR-MCNC: 147 MG/DL — HIGH (ref 70–99)
GLUCOSE BLDC GLUCOMTR-MCNC: 148 MG/DL — HIGH (ref 70–99)
GLUCOSE SERPL-MCNC: 140 MG/DL — HIGH (ref 70–99)
HCT VFR BLD CALC: 40.5 % — LOW (ref 42–52)
HGB BLD-MCNC: 13.5 G/DL — LOW (ref 14–18)
IMM GRANULOCYTES NFR BLD AUTO: 0.4 % — HIGH (ref 0.1–0.3)
LYMPHOCYTES # BLD AUTO: 0.88 K/UL — LOW (ref 1.2–3.4)
LYMPHOCYTES # BLD AUTO: 7.3 % — LOW (ref 20.5–51.1)
MAGNESIUM SERPL-MCNC: 2.1 MG/DL — SIGNIFICANT CHANGE UP (ref 1.8–2.4)
MCHC RBC-ENTMCNC: 31 PG — SIGNIFICANT CHANGE UP (ref 27–31)
MCHC RBC-ENTMCNC: 33.3 G/DL — SIGNIFICANT CHANGE UP (ref 32–37)
MCV RBC AUTO: 93.1 FL — SIGNIFICANT CHANGE UP (ref 80–94)
MONOCYTES # BLD AUTO: 0.95 K/UL — HIGH (ref 0.1–0.6)
MONOCYTES NFR BLD AUTO: 7.9 % — SIGNIFICANT CHANGE UP (ref 1.7–9.3)
NEUTROPHILS # BLD AUTO: 10.18 K/UL — HIGH (ref 1.4–6.5)
NEUTROPHILS NFR BLD AUTO: 84.3 % — HIGH (ref 42.2–75.2)
NRBC # BLD: 0 /100 WBCS — SIGNIFICANT CHANGE UP (ref 0–0)
PHOSPHATE SERPL-MCNC: 2.6 MG/DL — SIGNIFICANT CHANGE UP (ref 2.1–4.9)
PLATELET # BLD AUTO: 258 K/UL — SIGNIFICANT CHANGE UP (ref 130–400)
POTASSIUM SERPL-MCNC: 4.6 MMOL/L — SIGNIFICANT CHANGE UP (ref 3.5–5)
POTASSIUM SERPL-SCNC: 4.6 MMOL/L — SIGNIFICANT CHANGE UP (ref 3.5–5)
RBC # BLD: 4.35 M/UL — LOW (ref 4.7–6.1)
RBC # FLD: 13.3 % — SIGNIFICANT CHANGE UP (ref 11.5–14.5)
SODIUM SERPL-SCNC: 139 MMOL/L — SIGNIFICANT CHANGE UP (ref 135–146)
WBC # BLD: 12.07 K/UL — HIGH (ref 4.8–10.8)
WBC # FLD AUTO: 12.07 K/UL — HIGH (ref 4.8–10.8)

## 2019-08-06 PROCEDURE — 99024 POSTOP FOLLOW-UP VISIT: CPT

## 2019-08-06 RX ORDER — ACETAMINOPHEN 500 MG
2 TABLET ORAL
Qty: 0 | Refills: 0 | DISCHARGE
Start: 2019-08-06

## 2019-08-06 RX ORDER — SODIUM,POTASSIUM PHOSPHATES 278-250MG
1 POWDER IN PACKET (EA) ORAL ONCE
Refills: 0 | Status: COMPLETED | OUTPATIENT
Start: 2019-08-06 | End: 2019-08-06

## 2019-08-06 RX ORDER — ONDANSETRON 8 MG/1
4 TABLET, FILM COATED ORAL
Refills: 0 | Status: DISCONTINUED | OUTPATIENT
Start: 2019-08-06 | End: 2019-08-06

## 2019-08-06 RX ORDER — OXYCODONE HYDROCHLORIDE 5 MG/1
1 TABLET ORAL
Qty: 0 | Refills: 0 | DISCHARGE
Start: 2019-08-06 | End: 2019-08-10

## 2019-08-06 RX ADMIN — HEPARIN SODIUM 5000 UNIT(S): 5000 INJECTION INTRAVENOUS; SUBCUTANEOUS at 05:32

## 2019-08-06 RX ADMIN — Medication 650 MILLIGRAM(S): at 13:32

## 2019-08-06 RX ADMIN — HEPARIN SODIUM 5000 UNIT(S): 5000 INJECTION INTRAVENOUS; SUBCUTANEOUS at 13:35

## 2019-08-06 RX ADMIN — PANTOPRAZOLE SODIUM 40 MILLIGRAM(S): 20 TABLET, DELAYED RELEASE ORAL at 05:32

## 2019-08-06 RX ADMIN — Medication 650 MILLIGRAM(S): at 05:33

## 2019-08-06 RX ADMIN — Medication 650 MILLIGRAM(S): at 17:08

## 2019-08-06 RX ADMIN — Medication 650 MILLIGRAM(S): at 14:02

## 2019-08-06 RX ADMIN — LISINOPRIL 40 MILLIGRAM(S): 2.5 TABLET ORAL at 05:32

## 2019-08-06 RX ADMIN — Medication 1 PACKET(S): at 15:07

## 2019-08-06 RX ADMIN — Medication 75 MICROGRAM(S): at 05:32

## 2019-08-06 NOTE — DISCHARGE NOTE PROVIDER - HOSPITAL COURSE
79m presented with abdominal pain, ct scan showed ventral hernia containing a short segment of small bowel. The patient was cleared by medicine and cardiology and went to the OR for ventral hernia repair with mesh. Post operatively the patient tolerated diet, had difficulty ambulating and will be discharged to snf to work on regaining his strength. The patient will follow up with Dr. Jamison in 2 weeks.

## 2019-08-06 NOTE — PROGRESS NOTE ADULT - ATTENDING COMMENTS
pt continues to have mild improving pain  tolerating diet  plan for or tomorrow
pt ready to go home but refusing because he believes he needs more time here  passing bowel movements, pain tolerable, wound clean  will obtain pt for eval to characterize his deconditioning and then anticipate
or today
pain tolerable  plan to ambulate today  denies gas  anticipate for tomorrow

## 2019-08-06 NOTE — PROGRESS NOTE ADULT - SUBJECTIVE AND OBJECTIVE BOX
GENERAL SURGERY PROGRESS NOTE     THERESE ALMANZA  79y  Male  Hospital day :5d  POD:  Procedure: Incisional hernia repair with mesh    OVERNIGHT EVENTS:  Overnight had pain, nausea. Received zofran and 2mg morphine.     T(F): 99.2 (08-06-19 @ 03:00), Max: 99.2 (08-06-19 @ 03:00)  HR: 88 (08-06-19 @ 05:43) (77 - 94)  BP: 158/77 (08-06-19 @ 05:43) (115/57 - 177/97)  ABP: --  ABP(mean): --  RR: 18 (08-06-19 @ 03:00) (18 - 20)  SpO2: 95% (08-05-19 @ 23:30) (95% - 95%)    DIET/FLUIDS: dextrose 5%. 1000 milliLiter(s) IV Continuous <Continuous>  GI proph:  pantoprazole    Tablet 40 milliGRAM(s) Oral before breakfast    AC/ proph: heparin  Injectable 5000 Unit(s) SubCutaneous every 8 hours    ABx:     PHYSICAL EXAM:  GENERAL: NAD, well-appearing  CHEST/LUNG: Clear to auscultation bilaterally  HEART: Regular rate and rhythm  ABDOMEN: Soft, Nontender, Nondistended;   EXTREMITIES:  No clubbing, cyanosis, or edema      LABS  Labs:  CAPILLARY BLOOD GLUCOSE      POCT Blood Glucose.: 160 mg/dL (05 Aug 2019 22:15)  POCT Blood Glucose.: 131 mg/dL (05 Aug 2019 17:29)  POCT Blood Glucose.: 171 mg/dL (05 Aug 2019 11:56)  POCT Blood Glucose.: 113 mg/dL (05 Aug 2019 08:02)                          14.5   11.30 )-----------( 211      ( 04 Aug 2019 20:00 )             43.1         08-04    141  |  106  |  12  ----------------------------<  167<H>  4.0   |  24  |  1.1

## 2019-08-06 NOTE — DISCHARGE NOTE PROVIDER - CARE PROVIDER_API CALL
Thom Jamison)  Surgery  [address] [place] [address], [address] [place] [address]  Phone: [phone]  Fax: [phone]  Follow Up Time:

## 2019-08-06 NOTE — DISCHARGE NOTE PROVIDER - NSDCCPCAREPLAN_GEN_ALL_CORE_FT
PRINCIPAL DISCHARGE DIAGNOSIS  Diagnosis: Incarcerated hernia  Assessment and Plan of Treatment: s/p ventral hernia repair with mesh.  Continue regular diet.  Dressings :  OK to shower normally. Leave steri-strips in place, they will fall off on their own in 1 week. Use abdominal binder as needed for comfort. OK to use ABD pad for drainage.   Pain : Take ibuprofen, tylenol around the clock (every 8 hours) for at least three days. Take oxycodone 5mg as needed for breakthrough pain. Please be aware, the medication can cause drowsiness, so reserve for night time use.   Activity : Please avoid heavy lifting (anything over 10 pounds) for at least 6 weeks.   Follow up : Call to schedule a follow up appointment in 2 weeks, [phone]

## 2019-08-06 NOTE — PROGRESS NOTE ADULT - ASSESSMENT
80 y/o male s/p ventral incisional hernia repair with mesh.    Plan:   - Pain control   - monitor BP   - IS   - Encourage ambulation   - DVT prophylaxis

## 2019-08-06 NOTE — CDI QUERY NOTE - NSCDIOTHERTXTBX2_GEN_ALL_CORE_FT
Lab indicates that this patient has:  Magnesium, Serum: 1.7 mg/dL (08-04-19 @ 20:00)  Magnesium, Serum: 1.7 mg/dL (08-02-19 @ 01:17)    Medical orders show that Pt received magnesium sulfate:  magnesium sulfate  IVPB 2 Gram(s) IV Intermittent once  magnesium sulfate  IVPB 2 Gram(s) IV Intermittent once     In order to accurately capture the diagnosis to the greatest degree of specificity reflecting the patient’s actual severity of illness, the documentation in this patient’s medical record requires additional clarification.  Please include more specific diagnosis in your Progress Note and/or Discharge Summary.    • Non significant lab findings.  • Hypomagnesemia.  • Other (please specify)  • Unable  to determine    Present on Admission:  Was the severity of the condition present on admission?  If so, please document in the chart that “(the condition) was present on admission.”    In responding to this request, please exercise your independent professional judgment.  The fact that a question is asked does not imply that any particular answer is desired or expected. Documentation clarification is required for compliance, accuracy in coding and billing, and reporting severity of illness, quality data   and risk of mortality.

## 2019-08-06 NOTE — CDI QUERY NOTE - NSCDIOTHERTXTBX_GEN_ALL_CORE_HH
Clinical documentation indicates that this patient has incisional hernia with episodes of incarceration. there is documentation of possible superimposed ischemia. In the operative report there is no comment on the bowel viability.                                                        In order to accurately capture the diagnosis to the greatest degree of specificity. The documentation in this patient’s medical record requires additional clarification.  Please include more specific diagnosis in your Progress Note and/or Discharge Summary.    • Transient acute bowel ischemia ruled in.	  • Transient acute bowel ischemia ruled out  • Other (please specify)  • Unable  to determine    Present on Admission:  Was the severity of the condition present on admission?  If so, please document in the chart that “(the condition) was present on admission.”    In responding to this request, please exercise your independent professional judgment.  The fact that a question is asked does not imply that any particular answer is desired or expected.    Documentation clarification is required for compliance, accuracy in coding and billing, and reporting severity of illness, quality data   and risk of mortality.

## 2019-08-06 NOTE — PHYSICAL THERAPY INITIAL EVALUATION ADULT - GENERAL OBSERVATIONS, REHAB EVAL
140-207 pm Chart reviewed. Pt. seen semirecline in bed , in No apparent distress , + IV lock, abdominal binder, Pt. agreed to activity/therapy.

## 2019-08-06 NOTE — DISCHARGE NOTE NURSING/CASE MANAGEMENT/SOCIAL WORK - NSDCDPATPORTLINK_GEN_ALL_CORE
You can access the [facility] Patient Portal, offered by [facility], by registering with the following website: [email or web address]

## 2019-08-07 ENCOUNTER — OUTPATIENT (OUTPATIENT)
Dept: OUTPATIENT SERVICES | Facility: HOSPITAL | Age: 79
LOS: 1 days | Discharge: HOME | End: 2019-08-07

## 2019-08-07 VITALS
DIASTOLIC BLOOD PRESSURE: 70 MMHG | RESPIRATION RATE: 18 BRPM | HEART RATE: 84 BPM | TEMPERATURE: 97 F | SYSTOLIC BLOOD PRESSURE: 129 MMHG

## 2019-08-07 DIAGNOSIS — Z90.49 ACQUIRED ABSENCE OF OTHER SPECIFIED PARTS OF DIGESTIVE TRACT: Chronic | ICD-10-CM

## 2019-08-07 DIAGNOSIS — R79.9 ABNORMAL FINDING OF BLOOD CHEMISTRY, UNSPECIFIED: ICD-10-CM

## 2019-08-07 DIAGNOSIS — Z98.890 OTHER SPECIFIED POSTPROCEDURAL STATES: Chronic | ICD-10-CM

## 2019-08-07 PROBLEM — I25.10 ATHEROSCLEROTIC HEART DISEASE OF NATIVE CORONARY ARTERY WITHOUT ANGINA PECTORIS: Chronic | Status: ACTIVE | Noted: 2019-08-01

## 2019-08-07 PROBLEM — E11.9 TYPE 2 DIABETES MELLITUS WITHOUT COMPLICATIONS: Chronic | Status: ACTIVE | Noted: 2019-08-01

## 2019-08-07 PROBLEM — I10 ESSENTIAL (PRIMARY) HYPERTENSION: Chronic | Status: ACTIVE | Noted: 2019-08-01

## 2019-08-09 ENCOUNTER — OUTPATIENT (OUTPATIENT)
Dept: OUTPATIENT SERVICES | Facility: HOSPITAL | Age: 79
LOS: 1 days | Discharge: HOME | End: 2019-08-09

## 2019-08-09 DIAGNOSIS — Z98.890 OTHER SPECIFIED POSTPROCEDURAL STATES: Chronic | ICD-10-CM

## 2019-08-09 DIAGNOSIS — Z90.49 ACQUIRED ABSENCE OF OTHER SPECIFIED PARTS OF DIGESTIVE TRACT: Chronic | ICD-10-CM

## 2019-08-09 DIAGNOSIS — R10.9 UNSPECIFIED ABDOMINAL PAIN: ICD-10-CM

## 2019-08-10 DIAGNOSIS — E83.42 HYPOMAGNESEMIA: ICD-10-CM

## 2019-08-10 DIAGNOSIS — K43.0 INCISIONAL HERNIA WITH OBSTRUCTION, WITHOUT GANGRENE: ICD-10-CM

## 2019-08-10 DIAGNOSIS — Z95.1 PRESENCE OF AORTOCORONARY BYPASS GRAFT: ICD-10-CM

## 2019-08-10 DIAGNOSIS — I10 ESSENTIAL (PRIMARY) HYPERTENSION: ICD-10-CM

## 2019-08-10 DIAGNOSIS — I25.2 OLD MYOCARDIAL INFARCTION: ICD-10-CM

## 2019-08-10 DIAGNOSIS — I25.10 ATHEROSCLEROTIC HEART DISEASE OF NATIVE CORONARY ARTERY WITHOUT ANGINA PECTORIS: ICD-10-CM

## 2019-08-10 DIAGNOSIS — E78.5 HYPERLIPIDEMIA, UNSPECIFIED: ICD-10-CM

## 2019-08-10 DIAGNOSIS — R10.9 UNSPECIFIED ABDOMINAL PAIN: ICD-10-CM

## 2019-08-10 DIAGNOSIS — E11.9 TYPE 2 DIABETES MELLITUS WITHOUT COMPLICATIONS: ICD-10-CM

## 2019-08-12 ENCOUNTER — OUTPATIENT (OUTPATIENT)
Dept: OUTPATIENT SERVICES | Facility: HOSPITAL | Age: 79
LOS: 1 days | Discharge: HOME | End: 2019-08-12

## 2019-08-12 DIAGNOSIS — Z90.49 ACQUIRED ABSENCE OF OTHER SPECIFIED PARTS OF DIGESTIVE TRACT: Chronic | ICD-10-CM

## 2019-08-12 DIAGNOSIS — Z98.890 OTHER SPECIFIED POSTPROCEDURAL STATES: Chronic | ICD-10-CM

## 2019-08-12 PROBLEM — Z00.00 ENCOUNTER FOR PREVENTIVE HEALTH EXAMINATION: Status: ACTIVE | Noted: 2019-08-12

## 2019-08-13 DIAGNOSIS — R79.9 ABNORMAL FINDING OF BLOOD CHEMISTRY, UNSPECIFIED: ICD-10-CM

## 2019-08-13 DIAGNOSIS — D64.9 ANEMIA, UNSPECIFIED: ICD-10-CM

## 2019-08-13 DIAGNOSIS — N39.0 URINARY TRACT INFECTION, SITE NOT SPECIFIED: ICD-10-CM

## 2019-08-15 ENCOUNTER — APPOINTMENT (OUTPATIENT)
Dept: SURGERY | Facility: CLINIC | Age: 79
End: 2019-08-15

## 2019-08-29 ENCOUNTER — APPOINTMENT (OUTPATIENT)
Dept: SURGERY | Facility: CLINIC | Age: 79
End: 2019-08-29
Payer: COMMERCIAL

## 2019-08-29 VITALS
SYSTOLIC BLOOD PRESSURE: 140 MMHG | HEIGHT: 69 IN | WEIGHT: 211 LBS | DIASTOLIC BLOOD PRESSURE: 82 MMHG | BODY MASS INDEX: 31.25 KG/M2

## 2019-08-29 PROCEDURE — 99024 POSTOP FOLLOW-UP VISIT: CPT

## 2019-08-29 NOTE — HISTORY OF PRESENT ILLNESS
[de-identified] : pt reporting weakness since the surgery, though improved, and he has been discharged from the rehab center\par he has a number of concerns that were addressed in sequence\par he reports that he has nausea in the pit of his stomach that pre-dates the original surgery for diverticulitis and has not changed.  i advised him to see GI if that continues to be distressing for him but that it is unlikely related to the problem for which I am seeing him\par he is worried that in the years since he had the last surgery he has lost health and fitness such that the recovery from this surgery is substantially harder.  I explained that that is possible, but his recovery from this surgery is overall as expected\par he wants to bathe.  i told him this is allowed

## 2019-08-29 NOTE — ASSESSMENT
[FreeTextEntry1] : appropriate recovery trajectory s/p open ventral hernia repair\par possible seroma draining from superior margin of wound

## 2019-08-29 NOTE — PHYSICAL EXAM
[de-identified] : abd s, nt, nd\par wound is open at the top with a small amount of serous drainage; I packed this with 1/4 inch cotton tape; the remainder of the wound is well-epithelialized with no areas of erythema or fluctuance

## 2019-09-05 ENCOUNTER — APPOINTMENT (OUTPATIENT)
Dept: SURGERY | Facility: CLINIC | Age: 79
End: 2019-09-05
Payer: COMMERCIAL

## 2019-09-05 VITALS
BODY MASS INDEX: 31.55 KG/M2 | HEIGHT: 69 IN | WEIGHT: 213 LBS | DIASTOLIC BLOOD PRESSURE: 70 MMHG | SYSTOLIC BLOOD PRESSURE: 138 MMHG

## 2019-09-05 PROCEDURE — 99024 POSTOP FOLLOW-UP VISIT: CPT

## 2019-09-05 NOTE — HISTORY OF PRESENT ILLNESS
[de-identified] : only weakness but pt ambulating, improving slowly\par denies fevers, chils, abdominal pain

## 2019-09-05 NOTE — PHYSICAL EXAM
[de-identified] : abd s, nt, nd\par superior portion of wound nearly closed, small amound of non-purulent serous fluid expressed, gauze placed and pt given instructions for coverage; the remainder of the wound is well-epithelialized with no areas of erythema or fluctuance

## 2019-09-20 ENCOUNTER — OUTPATIENT (OUTPATIENT)
Dept: OUTPATIENT SERVICES | Facility: HOSPITAL | Age: 79
LOS: 1 days | Discharge: HOME | End: 2019-09-20

## 2019-09-20 VITALS
HEIGHT: 69 IN | OXYGEN SATURATION: 95 % | WEIGHT: 216.05 LBS | RESPIRATION RATE: 18 BRPM | TEMPERATURE: 98 F | HEART RATE: 70 BPM | DIASTOLIC BLOOD PRESSURE: 72 MMHG | SYSTOLIC BLOOD PRESSURE: 150 MMHG

## 2019-09-20 DIAGNOSIS — G56.02 CARPAL TUNNEL SYNDROME, LEFT UPPER LIMB: ICD-10-CM

## 2019-09-20 DIAGNOSIS — Z01.818 ENCOUNTER FOR OTHER PREPROCEDURAL EXAMINATION: ICD-10-CM

## 2019-09-20 DIAGNOSIS — Z98.890 OTHER SPECIFIED POSTPROCEDURAL STATES: Chronic | ICD-10-CM

## 2019-09-20 DIAGNOSIS — Z90.49 ACQUIRED ABSENCE OF OTHER SPECIFIED PARTS OF DIGESTIVE TRACT: Chronic | ICD-10-CM

## 2019-09-20 RX ORDER — RAMIPRIL 5 MG
1 CAPSULE ORAL
Qty: 0 | Refills: 0 | DISCHARGE

## 2019-09-20 RX ORDER — METFORMIN HYDROCHLORIDE 850 MG/1
0 TABLET ORAL
Qty: 180 | Refills: 0 | DISCHARGE

## 2019-09-20 RX ORDER — RAMIPRIL 5 MG
0 CAPSULE ORAL
Qty: 90 | Refills: 0 | DISCHARGE

## 2019-09-20 NOTE — H&P PST ADULT - NSANTHOSAYNRD_GEN_A_CORE
Routing to PCP to review and advise.    Request for magic mouthwash for thrush    Adelina Verdugo RN  [facility]         No. NAPOLEON screening performed.  STOP BANG Legend: 0-2 = LOW Risk; 3-4 = INTERMEDIATE Risk; 5-8 = HIGH Risk

## 2019-09-20 NOTE — H&P PST ADULT - HISTORY OF PRESENT ILLNESS
Patient c/o bilateral hands carpel tunnel symptoms pain, numbness and tingling since 12/2018. patient was scheduled previously cancelled due to other emergency hernia surgery. Patient denies any c/o cp, palpitations, fever, cough or dysuria. Patient c/o SOB at times. Ex tolerance is limited due to SOB. No diagnosis of NAPOLEON. Patient c/o bilateral hands carpel tunnel symptoms pain, numbness and tingling since 12/2018. patient was scheduled previously cancelled due to an emergency hernia surgery. Patient denies any c/o cp, palpitations, fever, cough or dysuria. Patient c/o SOB at times. Ex tolerance is limited due to SOB. No diagnosis of NAPOLEON.  Patient lives alone and concerns about transportation and OR timing.

## 2019-09-20 NOTE — H&P PST ADULT - REASON FOR ADMISSION
78 yo male presents to PAST for left endoscopic carpel tunnel release possible open carpel tunnel release on 10/3 and right carpel tunnel release on 10/17/2019 at [facility] OR by Erlinda Swenson

## 2019-09-20 NOTE — H&P PST ADULT - NSICDXPASTMEDICALHX_GEN_ALL_CORE_FT
PAST MEDICAL HISTORY:  Coronary artery disease     Diabetes mellitus     Dizzy spells At times    H/O nausea     History of diverticulosis     Hypertension     Numbness and tingling Both hand fingers.    SOB (shortness of breath)

## 2019-09-20 NOTE — H&P PST ADULT - NSICDXPASTSURGICALHX_GEN_ALL_CORE_FT
PAST SURGICAL HISTORY:  H/O inguinal hernia repair     H/O ventral hernia repair     History of open heart surgery 5 vessel    History of partial colectomy

## 2019-09-25 PROBLEM — Z87.898 PERSONAL HISTORY OF OTHER SPECIFIED CONDITIONS: Chronic | Status: ACTIVE | Noted: 2019-09-20

## 2019-09-25 PROBLEM — R06.02 SHORTNESS OF BREATH: Chronic | Status: ACTIVE | Noted: 2019-09-20

## 2019-09-25 PROBLEM — R42 DIZZINESS AND GIDDINESS: Chronic | Status: ACTIVE | Noted: 2019-09-20

## 2019-09-25 PROBLEM — Z87.19 PERSONAL HISTORY OF OTHER DISEASES OF THE DIGESTIVE SYSTEM: Chronic | Status: ACTIVE | Noted: 2019-09-20

## 2019-10-08 ENCOUNTER — APPOINTMENT (OUTPATIENT)
Dept: CARDIOLOGY | Facility: CLINIC | Age: 79
End: 2019-10-08
Payer: COMMERCIAL

## 2019-10-08 PROCEDURE — 99215 OFFICE O/P EST HI 40 MIN: CPT

## 2019-10-08 PROCEDURE — 93000 ELECTROCARDIOGRAM COMPLETE: CPT

## 2019-10-09 ENCOUNTER — APPOINTMENT (OUTPATIENT)
Dept: CARDIOLOGY | Facility: CLINIC | Age: 79
End: 2019-10-09
Payer: COMMERCIAL

## 2019-10-09 PROCEDURE — 93306 TTE W/DOPPLER COMPLETE: CPT

## 2019-10-11 ENCOUNTER — EMERGENCY (EMERGENCY)
Facility: HOSPITAL | Age: 79
LOS: 0 days | Discharge: HOME | End: 2019-10-12
Admitting: SURGERY

## 2019-10-11 ENCOUNTER — INPATIENT (INPATIENT)
Facility: HOSPITAL | Age: 79
LOS: 0 days | Discharge: HOME HEALTH | End: 2019-10-12
Attending: SURGERY | Admitting: SURGERY
Payer: COMMERCIAL

## 2019-10-11 VITALS
SYSTOLIC BLOOD PRESSURE: 157 MMHG | TEMPERATURE: 98 F | OXYGEN SATURATION: 95 % | HEART RATE: 74 BPM | DIASTOLIC BLOOD PRESSURE: 70 MMHG | RESPIRATION RATE: 18 BRPM

## 2019-10-11 DIAGNOSIS — Z98.890 OTHER SPECIFIED POSTPROCEDURAL STATES: Chronic | ICD-10-CM

## 2019-10-11 DIAGNOSIS — E11.9 TYPE 2 DIABETES MELLITUS WITHOUT COMPLICATIONS: ICD-10-CM

## 2019-10-11 DIAGNOSIS — Z90.49 ACQUIRED ABSENCE OF OTHER SPECIFIED PARTS OF DIGESTIVE TRACT: Chronic | ICD-10-CM

## 2019-10-11 DIAGNOSIS — Z95.1 PRESENCE OF AORTOCORONARY BYPASS GRAFT: ICD-10-CM

## 2019-10-11 DIAGNOSIS — E78.5 HYPERLIPIDEMIA, UNSPECIFIED: ICD-10-CM

## 2019-10-11 DIAGNOSIS — R10.33 PERIUMBILICAL PAIN: ICD-10-CM

## 2019-10-11 DIAGNOSIS — L02.91 CUTANEOUS ABSCESS, UNSPECIFIED: ICD-10-CM

## 2019-10-11 DIAGNOSIS — L76.82 OTHER POSTPROCEDURAL COMPLICATIONS OF SKIN AND SUBCUTANEOUS TISSUE: ICD-10-CM

## 2019-10-11 DIAGNOSIS — I25.10 ATHEROSCLEROTIC HEART DISEASE OF NATIVE CORONARY ARTERY WITHOUT ANGINA PECTORIS: ICD-10-CM

## 2019-10-11 LAB
ALBUMIN SERPL ELPH-MCNC: 4.1 G/DL — SIGNIFICANT CHANGE UP (ref 3.5–5.2)
ALP SERPL-CCNC: 83 U/L — SIGNIFICANT CHANGE UP (ref 30–115)
ALT FLD-CCNC: 18 U/L — SIGNIFICANT CHANGE UP (ref 0–41)
ANION GAP SERPL CALC-SCNC: 12 MMOL/L — SIGNIFICANT CHANGE UP (ref 7–14)
APTT BLD: 33.2 SEC — SIGNIFICANT CHANGE UP (ref 27–39.2)
AST SERPL-CCNC: 15 U/L — SIGNIFICANT CHANGE UP (ref 0–41)
BASOPHILS # BLD AUTO: 0.03 K/UL — SIGNIFICANT CHANGE UP (ref 0–0.2)
BASOPHILS NFR BLD AUTO: 0.3 % — SIGNIFICANT CHANGE UP (ref 0–1)
BILIRUB SERPL-MCNC: 0.4 MG/DL — SIGNIFICANT CHANGE UP (ref 0.2–1.2)
BUN SERPL-MCNC: 10 MG/DL — SIGNIFICANT CHANGE UP (ref 10–20)
CALCIUM SERPL-MCNC: 9.7 MG/DL — SIGNIFICANT CHANGE UP (ref 8.5–10.1)
CHLORIDE SERPL-SCNC: 100 MMOL/L — SIGNIFICANT CHANGE UP (ref 98–110)
CO2 SERPL-SCNC: 27 MMOL/L — SIGNIFICANT CHANGE UP (ref 17–32)
CREAT SERPL-MCNC: 0.9 MG/DL — SIGNIFICANT CHANGE UP (ref 0.7–1.5)
EOSINOPHIL # BLD AUTO: 0.09 K/UL — SIGNIFICANT CHANGE UP (ref 0–0.7)
EOSINOPHIL NFR BLD AUTO: 0.9 % — SIGNIFICANT CHANGE UP (ref 0–8)
GLUCOSE SERPL-MCNC: 117 MG/DL — HIGH (ref 70–99)
HCT VFR BLD CALC: 38.3 % — LOW (ref 42–52)
HGB BLD-MCNC: 12.5 G/DL — LOW (ref 14–18)
IMM GRANULOCYTES NFR BLD AUTO: 0.4 % — HIGH (ref 0.1–0.3)
INR BLD: 1.11 RATIO — SIGNIFICANT CHANGE UP (ref 0.65–1.3)
LYMPHOCYTES # BLD AUTO: 1.4 K/UL — SIGNIFICANT CHANGE UP (ref 1.2–3.4)
LYMPHOCYTES # BLD AUTO: 14.8 % — LOW (ref 20.5–51.1)
MCHC RBC-ENTMCNC: 30 PG — SIGNIFICANT CHANGE UP (ref 27–31)
MCHC RBC-ENTMCNC: 32.6 G/DL — SIGNIFICANT CHANGE UP (ref 32–37)
MCV RBC AUTO: 92.1 FL — SIGNIFICANT CHANGE UP (ref 80–94)
MONOCYTES # BLD AUTO: 0.77 K/UL — HIGH (ref 0.1–0.6)
MONOCYTES NFR BLD AUTO: 8.1 % — SIGNIFICANT CHANGE UP (ref 1.7–9.3)
NEUTROPHILS # BLD AUTO: 7.15 K/UL — HIGH (ref 1.4–6.5)
NEUTROPHILS NFR BLD AUTO: 75.5 % — HIGH (ref 42.2–75.2)
NRBC # BLD: 0 /100 WBCS — SIGNIFICANT CHANGE UP (ref 0–0)
PLATELET # BLD AUTO: 322 K/UL — SIGNIFICANT CHANGE UP (ref 130–400)
POTASSIUM SERPL-MCNC: 4.3 MMOL/L — SIGNIFICANT CHANGE UP (ref 3.5–5)
POTASSIUM SERPL-SCNC: 4.3 MMOL/L — SIGNIFICANT CHANGE UP (ref 3.5–5)
PROT SERPL-MCNC: 6.7 G/DL — SIGNIFICANT CHANGE UP (ref 6–8)
PROTHROM AB SERPL-ACNC: 12.7 SEC — SIGNIFICANT CHANGE UP (ref 9.95–12.87)
RBC # BLD: 4.16 M/UL — LOW (ref 4.7–6.1)
RBC # FLD: 13.7 % — SIGNIFICANT CHANGE UP (ref 11.5–14.5)
SODIUM SERPL-SCNC: 139 MMOL/L — SIGNIFICANT CHANGE UP (ref 135–146)
WBC # BLD: 9.48 K/UL — SIGNIFICANT CHANGE UP (ref 4.8–10.8)
WBC # FLD AUTO: 9.48 K/UL — SIGNIFICANT CHANGE UP (ref 4.8–10.8)

## 2019-10-11 PROCEDURE — 74177 CT ABD & PELVIS W/CONTRAST: CPT | Mod: 26

## 2019-10-11 PROCEDURE — 99285 EMERGENCY DEPT VISIT HI MDM: CPT | Mod: GC

## 2019-10-11 RX ORDER — AMPICILLIN SODIUM AND SULBACTAM SODIUM 250; 125 MG/ML; MG/ML
3 INJECTION, POWDER, FOR SUSPENSION INTRAMUSCULAR; INTRAVENOUS ONCE
Refills: 0 | Status: COMPLETED | OUTPATIENT
Start: 2019-10-11 | End: 2019-10-11

## 2019-10-11 RX ORDER — IOHEXOL 300 MG/ML
30 INJECTION, SOLUTION INTRAVENOUS ONCE
Refills: 0 | Status: COMPLETED | OUTPATIENT
Start: 2019-10-11 | End: 2019-10-11

## 2019-10-11 RX ADMIN — IOHEXOL 30 MILLILITER(S): 300 INJECTION, SOLUTION INTRAVENOUS at 19:50

## 2019-10-11 RX ADMIN — AMPICILLIN SODIUM AND SULBACTAM SODIUM 200 GRAM(S): 250; 125 INJECTION, POWDER, FOR SUSPENSION INTRAMUSCULAR; INTRAVENOUS at 19:50

## 2019-10-11 NOTE — ED PROVIDER NOTE - NS ED ROS FT
REVIEW OF SYSTEMS:    CONSTITUTIONAL: No weakness, fevers or chills  EYES/ENT: No visual changes;  No vertigo or throat pain   NECK: No pain or stiffness  RESPIRATORY: No cough, wheezing, hemoptysis; No shortness of breath  CARDIOVASCULAR: No chest pain or palpitations  GASTROINTESTINAL: No nausea, vomiting, or hematemesis; No diarrhea or constipation. No melena or hematochezia. Periumbilical pain and redness  GENITOURINARY: No dysuria, frequency or hematuria  NEUROLOGICAL: No numbness or weakness  SKIN: No itching, rashes

## 2019-10-11 NOTE — CONSULT NOTE ADULT - SUBJECTIVE AND OBJECTIVE BOX
THERESE ALMANZA [id number]  79y Male      HPI:  79 year old male presents to ED with 2 day history of pain and erythema surrounding umbilical surgical site. Patient underwent ventral hernia repair in August 2019 with Dr. Jamison and was seen in the office twice there after as well as his primary care provider at the end of September. Reports no issues thus far until 2 days ago.     PAST MEDICAL & SURGICAL HISTORY:  Numbness and tingling: Both hand fingers.  Dizzy spells: At times  History of diverticulosis  H/O nausea  SOB (shortness of breath)  Hypertension  Diabetes mellitus  Coronary artery disease  H/O ventral hernia repair  History of open heart surgery: 5 vessel  H/O inguinal hernia repair  History of partial colectomy        MEDICATIONS  (STANDING):    MEDICATIONS  (PRN):      Allergies    No Known Allergies    Intolerances        REVIEW OF SYSTEMS    [x ] A ten-point review of systems was otherwise negative except as noted.  [ ] Due to altered mental status/intubation, subjective information were not able to be obtained from the patient. History was obtained, to the extent possible, from review of the chart and collateral sources of information.      Vital Signs Last 24 Hrs  T(C): 36.6 (11 Oct 2019 16:50), Max: 36.6 (11 Oct 2019 16:50)  T(F): 97.8 (11 Oct 2019 16:50), Max: 97.8 (11 Oct 2019 16:50)  HR: 74 (11 Oct 2019 16:50) (74 - 74)  BP: 157/70 (11 Oct 2019 16:50) (157/70 - 157/70)  BP(mean): --  RR: 18 (11 Oct 2019 16:50) (18 - 18)  SpO2: 95% (11 Oct 2019 16:50) (95% - 95%)    PHYSICAL EXAM:  GENERAL: NAD, well-appearing  CHEST/LUNG: Clear to auscultation bilaterally  HEART: Regular rate and rhythm  ABDOMEN: Soft, Nontender, Nondistended; umbilical surgical site erythema and tenderness with overlying scabbing  EXTREMITIES:  No clubbing, cyanosis, or edema      LABS:  Labs:  CAPILLARY BLOOD GLUCOSE                              12.5   9.48  )-----------( 322      ( 11 Oct 2019 18:50 )             38.3       Auto Neutrophil %: 75.5 % (10-11-19 @ 18:50)  Auto Immature Granulocyte %: 0.4 % (10-11-19 @ 18:50)    10-11    139  |  100  |  10  ----------------------------<  117<H>  4.3   |  27  |  0.9      Calcium, Total Serum: 9.7 mg/dL (10-11-19 @ 18:50)      LFTs:             6.7  | 0.4  | 15       ------------------[83      ( 11 Oct 2019 18:50 )  4.1  | x    | 18          Lipase:x      Amylase:x             Coags:     12.70  ----< 1.11    ( 11 Oct 2019 18:50 )     33.2                        RADIOLOGY & ADDITIONAL STUDIES:

## 2019-10-11 NOTE — ED ADULT NURSE NOTE - NSIMPLEMENTINTERV_GEN_ALL_ED
Implemented All Universal Safety Interventions:  [place] to call system. Call bell, personal items and telephone within reach. Instruct patient to call for assistance. Room bathroom lighting operational. Non-slip footwear when patient is off stretcher. Physically safe environment: no spills, clutter or unnecessary equipment. Stretcher in lowest position, wheels locked, appropriate side rails in place.

## 2019-10-11 NOTE — ED PROVIDER NOTE - PHYSICAL EXAMINATION
PHYSICAL EXAM:  GENERAL: NAD, well-developed  HEAD:  Atraumatic, Normocephalic  EYES: EOMI, PERRLA, conjunctiva and sclera clear  NECK: Supple, No JVD  CHEST/LUNG: Clear to auscultation bilaterally; No wheeze  HEART: Regular rate and rhythm; No murmurs, rubs, or gallops  ABDOMEN: Soft, Nondistended; Bowel sounds present, ventral incision scar visible, periumbilical erythema with induration, fluctuant abscess with skin abrasion, tender to palpation  EXTREMITIES:  2+ Peripheral Pulses, No clubbing, cyanosis, or edema  PSYCH: AAOx3  NEUROLOGY: non-focal  SKIN: No rashes or lesions

## 2019-10-11 NOTE — ED PROVIDER NOTE - ATTENDING CONTRIBUTION TO CARE
80yo man h/o DM, HLD, CAD s/p CABG, diverticulosis s/p colectomy with reversal 5 years ago followed by ventral hernia repair 2 months ago now presents with tender 3cm swelling with scant drainage over the past few days. No fever, chills, ozzie abdominal pain, GI sx, but reports area feels sore. Eating well, no diarrhea, no bloody stools. On exam pt is nontoxic appearing and comfortable, lungs CTA, CVS1S2 RRR abd soft. Vertical abdominal incision is mostly healed, but there is a 3cm tender swelling, unclear if fluctuant vs bowel herniation, soft, mildly tender, with surrounding erythema and induration of the bowel wall. Will check labs, surgical eval, CT for hernia vs abscess with cellulitis.

## 2019-10-11 NOTE — ED PROVIDER NOTE - PSH
H/O inguinal hernia repair    H/O ventral hernia repair    History of open heart surgery  5 vessel  History of partial colectomy

## 2019-10-11 NOTE — ED PROVIDER NOTE - OBJECTIVE STATEMENT
80 yo M w/ hx of CAD s/p CABG, DLD, DM, hx of colectomy 2/2 diverticulosis s/p reversal 5 years ago, ventral hernia s/p repair 2 months ago present with periumbilical pain. Pt was doing well after ventral hernia repair, f/u with surgeon and PMD were unremarkable as of 3 weeks ago. Pt noticed pain to periumbilical area few days ago, a/w redness as well as formation of small bump on skin, denies drainage. Pt denies fever, chills, SOB, CP, N/V, abdominal pain, dysuria. Able to tolerate regular diet, last BM normal per pt today prior to presenting to ED.

## 2019-10-11 NOTE — CONSULT NOTE ADULT - ASSESSMENT
79 year old male with surgical history significant for ventral hernia repair in August 2019 with Dr. Jamison presents to ED with 2 day history of pain and erythema over umbilical surgical site.     - Continue PO contrast for CT  - Will follow CT results for further management

## 2019-10-11 NOTE — ED PROVIDER NOTE - PMH
Coronary artery disease    Diabetes mellitus    Dizzy spells  At times  H/O nausea    History of diverticulosis    Hypertension    Numbness and tingling  Both hand fingers.  SOB (shortness of breath)

## 2019-10-11 NOTE — ED PROVIDER NOTE - CLINICAL SUMMARY MEDICAL DECISION MAKING FREE TEXT BOX
patien tevaluated post op for stomach lesion found to have abscess on ct scan,drained at bedside but required iv abx.

## 2019-10-11 NOTE — ED PROVIDER NOTE - PROGRESS NOTE DETAILS
RUE venous duplex pre-bruner negative, CBC shows bands, WBC elevated for this patient since she has baseline leukopenia, circumferential erythema concerning for cellulitis, also in mild WANDA, will admit, sign out given to MAR spoke with surgery -> will eval patient, will obtain CT abdomen and pelvis with oral and IV contrast per recommendations i/d done by surgery. surgeyr waqnts abx and admission

## 2019-10-12 ENCOUNTER — TRANSCRIPTION ENCOUNTER (OUTPATIENT)
Age: 79
End: 2019-10-12

## 2019-10-12 VITALS — HEART RATE: 62 BPM

## 2019-10-12 LAB
ANION GAP SERPL CALC-SCNC: 13 MMOL/L — SIGNIFICANT CHANGE UP (ref 7–14)
BASOPHILS # BLD AUTO: 0.03 K/UL — SIGNIFICANT CHANGE UP (ref 0–0.2)
BASOPHILS NFR BLD AUTO: 0.5 % — SIGNIFICANT CHANGE UP (ref 0–1)
BUN SERPL-MCNC: 11 MG/DL — SIGNIFICANT CHANGE UP (ref 10–20)
CALCIUM SERPL-MCNC: 9.5 MG/DL — SIGNIFICANT CHANGE UP (ref 8.5–10.1)
CHLORIDE SERPL-SCNC: 100 MMOL/L — SIGNIFICANT CHANGE UP (ref 98–110)
CO2 SERPL-SCNC: 26 MMOL/L — SIGNIFICANT CHANGE UP (ref 17–32)
CREAT SERPL-MCNC: 1 MG/DL — SIGNIFICANT CHANGE UP (ref 0.7–1.5)
EOSINOPHIL # BLD AUTO: 0.11 K/UL — SIGNIFICANT CHANGE UP (ref 0–0.7)
EOSINOPHIL NFR BLD AUTO: 1.7 % — SIGNIFICANT CHANGE UP (ref 0–8)
ESTIMATED AVERAGE GLUCOSE: 163 MG/DL — HIGH (ref 68–114)
GLUCOSE BLDC GLUCOMTR-MCNC: 149 MG/DL — HIGH (ref 70–99)
GLUCOSE BLDC GLUCOMTR-MCNC: 196 MG/DL — HIGH (ref 70–99)
GLUCOSE SERPL-MCNC: 185 MG/DL — HIGH (ref 70–99)
HBA1C BLD-MCNC: 7.3 % — HIGH (ref 4–5.6)
HCT VFR BLD CALC: 37.6 % — LOW (ref 42–52)
HGB BLD-MCNC: 12.3 G/DL — LOW (ref 14–18)
IMM GRANULOCYTES NFR BLD AUTO: 0.5 % — HIGH (ref 0.1–0.3)
LYMPHOCYTES # BLD AUTO: 1.02 K/UL — LOW (ref 1.2–3.4)
LYMPHOCYTES # BLD AUTO: 16.2 % — LOW (ref 20.5–51.1)
MAGNESIUM SERPL-MCNC: 2 MG/DL — SIGNIFICANT CHANGE UP (ref 1.8–2.4)
MCHC RBC-ENTMCNC: 30.1 PG — SIGNIFICANT CHANGE UP (ref 27–31)
MCHC RBC-ENTMCNC: 32.7 G/DL — SIGNIFICANT CHANGE UP (ref 32–37)
MCV RBC AUTO: 92.2 FL — SIGNIFICANT CHANGE UP (ref 80–94)
MONOCYTES # BLD AUTO: 0.46 K/UL — SIGNIFICANT CHANGE UP (ref 0.1–0.6)
MONOCYTES NFR BLD AUTO: 7.3 % — SIGNIFICANT CHANGE UP (ref 1.7–9.3)
NEUTROPHILS # BLD AUTO: 4.64 K/UL — SIGNIFICANT CHANGE UP (ref 1.4–6.5)
NEUTROPHILS NFR BLD AUTO: 73.8 % — SIGNIFICANT CHANGE UP (ref 42.2–75.2)
NRBC # BLD: 0 /100 WBCS — SIGNIFICANT CHANGE UP (ref 0–0)
PHOSPHATE SERPL-MCNC: 3.1 MG/DL — SIGNIFICANT CHANGE UP (ref 2.1–4.9)
PLATELET # BLD AUTO: 323 K/UL — SIGNIFICANT CHANGE UP (ref 130–400)
POTASSIUM SERPL-MCNC: 4.4 MMOL/L — SIGNIFICANT CHANGE UP (ref 3.5–5)
POTASSIUM SERPL-SCNC: 4.4 MMOL/L — SIGNIFICANT CHANGE UP (ref 3.5–5)
RBC # BLD: 4.08 M/UL — LOW (ref 4.7–6.1)
RBC # FLD: 13.6 % — SIGNIFICANT CHANGE UP (ref 11.5–14.5)
SODIUM SERPL-SCNC: 139 MMOL/L — SIGNIFICANT CHANGE UP (ref 135–146)
WBC # BLD: 6.29 K/UL — SIGNIFICANT CHANGE UP (ref 4.8–10.8)
WBC # FLD AUTO: 6.29 K/UL — SIGNIFICANT CHANGE UP (ref 4.8–10.8)

## 2019-10-12 PROCEDURE — 99024 POSTOP FOLLOW-UP VISIT: CPT

## 2019-10-12 RX ORDER — SODIUM CHLORIDE 9 MG/ML
1000 INJECTION, SOLUTION INTRAVENOUS
Refills: 0 | Status: DISCONTINUED | OUTPATIENT
Start: 2019-10-12 | End: 2019-10-12

## 2019-10-12 RX ORDER — GLUCAGON INJECTION, SOLUTION 0.5 MG/.1ML
1 INJECTION, SOLUTION SUBCUTANEOUS ONCE
Refills: 0 | Status: DISCONTINUED | OUTPATIENT
Start: 2019-10-12 | End: 2019-10-12

## 2019-10-12 RX ORDER — METRONIDAZOLE 500 MG
1 TABLET ORAL
Qty: 30 | Refills: 0
Start: 2019-10-12 | End: 2019-10-21

## 2019-10-12 RX ORDER — AMPICILLIN SODIUM AND SULBACTAM SODIUM 250; 125 MG/ML; MG/ML
3 INJECTION, POWDER, FOR SUSPENSION INTRAMUSCULAR; INTRAVENOUS ONCE
Refills: 0 | Status: COMPLETED | OUTPATIENT
Start: 2019-10-12 | End: 2019-10-12

## 2019-10-12 RX ORDER — ASPIRIN/CALCIUM CARB/MAGNESIUM 324 MG
1 TABLET ORAL
Qty: 0 | Refills: 0 | DISCHARGE

## 2019-10-12 RX ORDER — AMPICILLIN SODIUM AND SULBACTAM SODIUM 250; 125 MG/ML; MG/ML
3 INJECTION, POWDER, FOR SUSPENSION INTRAMUSCULAR; INTRAVENOUS EVERY 6 HOURS
Refills: 0 | Status: DISCONTINUED | OUTPATIENT
Start: 2019-10-12 | End: 2019-10-12

## 2019-10-12 RX ORDER — CIPROFLOXACIN LACTATE 400MG/40ML
1 VIAL (ML) INTRAVENOUS
Qty: 20 | Refills: 0
Start: 2019-10-12 | End: 2019-10-21

## 2019-10-12 RX ORDER — INFLUENZA VIRUS VACCINE 15; 15; 15; 15 UG/.5ML; UG/.5ML; UG/.5ML; UG/.5ML
0.5 SUSPENSION INTRAMUSCULAR ONCE
Refills: 0 | Status: DISCONTINUED | OUTPATIENT
Start: 2019-10-12 | End: 2019-10-12

## 2019-10-12 RX ORDER — PANTOPRAZOLE SODIUM 20 MG/1
40 TABLET, DELAYED RELEASE ORAL
Refills: 0 | Status: DISCONTINUED | OUTPATIENT
Start: 2019-10-12 | End: 2019-10-12

## 2019-10-12 RX ORDER — INSULIN LISPRO 100/ML
VIAL (ML) SUBCUTANEOUS
Refills: 0 | Status: DISCONTINUED | OUTPATIENT
Start: 2019-10-12 | End: 2019-10-12

## 2019-10-12 RX ORDER — CHLORHEXIDINE GLUCONATE 213 G/1000ML
1 SOLUTION TOPICAL EVERY 12 HOURS
Refills: 0 | Status: DISCONTINUED | OUTPATIENT
Start: 2019-10-12 | End: 2019-10-12

## 2019-10-12 RX ORDER — DEXTROSE 50 % IN WATER 50 %
25 SYRINGE (ML) INTRAVENOUS ONCE
Refills: 0 | Status: DISCONTINUED | OUTPATIENT
Start: 2019-10-12 | End: 2019-10-12

## 2019-10-12 RX ORDER — LEVOTHYROXINE SODIUM 125 MCG
75 TABLET ORAL DAILY
Refills: 0 | Status: DISCONTINUED | OUTPATIENT
Start: 2019-10-12 | End: 2019-10-12

## 2019-10-12 RX ORDER — VANCOMYCIN HCL 1 G
1000 VIAL (EA) INTRAVENOUS EVERY 12 HOURS
Refills: 0 | Status: DISCONTINUED | OUTPATIENT
Start: 2019-10-12 | End: 2019-10-12

## 2019-10-12 RX ORDER — LISINOPRIL 2.5 MG/1
40 TABLET ORAL DAILY
Refills: 0 | Status: DISCONTINUED | OUTPATIENT
Start: 2019-10-12 | End: 2019-10-12

## 2019-10-12 RX ORDER — OXYCODONE HYDROCHLORIDE 5 MG/1
5 TABLET ORAL EVERY 6 HOURS
Refills: 0 | Status: DISCONTINUED | OUTPATIENT
Start: 2019-10-12 | End: 2019-10-12

## 2019-10-12 RX ORDER — VANCOMYCIN HCL 1 G
1000 VIAL (EA) INTRAVENOUS ONCE
Refills: 0 | Status: COMPLETED | OUTPATIENT
Start: 2019-10-12 | End: 2019-10-12

## 2019-10-12 RX ORDER — OXYCODONE HYDROCHLORIDE 5 MG/1
1 TABLET ORAL
Qty: 10 | Refills: 0
Start: 2019-10-12

## 2019-10-12 RX ORDER — ASPIRIN/CALCIUM CARB/MAGNESIUM 324 MG
325 TABLET ORAL DAILY
Refills: 0 | Status: DISCONTINUED | OUTPATIENT
Start: 2019-10-12 | End: 2019-10-12

## 2019-10-12 RX ORDER — HEPARIN SODIUM 5000 [USP'U]/ML
5000 INJECTION INTRAVENOUS; SUBCUTANEOUS EVERY 8 HOURS
Refills: 0 | Status: DISCONTINUED | OUTPATIENT
Start: 2019-10-12 | End: 2019-10-12

## 2019-10-12 RX ORDER — ACETAMINOPHEN 500 MG
650 TABLET ORAL EVERY 4 HOURS
Refills: 0 | Status: DISCONTINUED | OUTPATIENT
Start: 2019-10-12 | End: 2019-10-12

## 2019-10-12 RX ORDER — DEXTROSE 50 % IN WATER 50 %
15 SYRINGE (ML) INTRAVENOUS ONCE
Refills: 0 | Status: DISCONTINUED | OUTPATIENT
Start: 2019-10-12 | End: 2019-10-12

## 2019-10-12 RX ORDER — ATORVASTATIN CALCIUM 80 MG/1
80 TABLET, FILM COATED ORAL AT BEDTIME
Refills: 0 | Status: DISCONTINUED | OUTPATIENT
Start: 2019-10-12 | End: 2019-10-12

## 2019-10-12 RX ORDER — DEXTROSE 50 % IN WATER 50 %
12.5 SYRINGE (ML) INTRAVENOUS ONCE
Refills: 0 | Status: DISCONTINUED | OUTPATIENT
Start: 2019-10-12 | End: 2019-10-12

## 2019-10-12 RX ADMIN — Medication 2: at 12:32

## 2019-10-12 RX ADMIN — AMPICILLIN SODIUM AND SULBACTAM SODIUM 200 GRAM(S): 250; 125 INJECTION, POWDER, FOR SUSPENSION INTRAMUSCULAR; INTRAVENOUS at 12:34

## 2019-10-12 RX ADMIN — Medication 75 MICROGRAM(S): at 05:32

## 2019-10-12 RX ADMIN — HEPARIN SODIUM 5000 UNIT(S): 5000 INJECTION INTRAVENOUS; SUBCUTANEOUS at 05:32

## 2019-10-12 RX ADMIN — Medication 250 MILLIGRAM(S): at 05:31

## 2019-10-12 RX ADMIN — AMPICILLIN SODIUM AND SULBACTAM SODIUM 200 GRAM(S): 250; 125 INJECTION, POWDER, FOR SUSPENSION INTRAMUSCULAR; INTRAVENOUS at 02:13

## 2019-10-12 RX ADMIN — CHLORHEXIDINE GLUCONATE 1 APPLICATION(S): 213 SOLUTION TOPICAL at 05:32

## 2019-10-12 RX ADMIN — LISINOPRIL 40 MILLIGRAM(S): 2.5 TABLET ORAL at 05:32

## 2019-10-12 RX ADMIN — HEPARIN SODIUM 5000 UNIT(S): 5000 INJECTION INTRAVENOUS; SUBCUTANEOUS at 13:29

## 2019-10-12 RX ADMIN — AMPICILLIN SODIUM AND SULBACTAM SODIUM 200 GRAM(S): 250; 125 INJECTION, POWDER, FOR SUSPENSION INTRAMUSCULAR; INTRAVENOUS at 06:25

## 2019-10-12 RX ADMIN — Medication 250 MILLIGRAM(S): at 10:55

## 2019-10-12 RX ADMIN — PANTOPRAZOLE SODIUM 40 MILLIGRAM(S): 20 TABLET, DELAYED RELEASE ORAL at 05:32

## 2019-10-12 NOTE — DISCHARGE NOTE PROVIDER - NSDCFUSCHEDAPPT_GEN_ALL_CORE_FT
THERESE ALMANZA ; 10/14/2019 ; [facility] [address] ; 10/17/2019 ; [facility]  THERESE ALMANZA ; 10/31/2019 ; [facility]

## 2019-10-12 NOTE — DISCHARGE NOTE NURSING/CASE MANAGEMENT/SOCIAL WORK - PATIENT PORTAL LINK FT
You can access the FollowMyHealth Patient Portal offered by [facility] by registering at the following website: [email or web address]. By joining [facility]’s FollowMyHealth portal, you will also be able to view your health information using other applications (apps) compatible with our system.

## 2019-10-12 NOTE — PROGRESS NOTE ADULT - ASSESSMENT
79 year old male with surgical history significant for ventral hernia repair in August 2019 with Dr. Jamison presents to ED with 2 day history of pain and erythema over umbilical surgical site.     - Continue PO contrast for CT  - Will follow CT results for further management 79 year old male with surgical history significant for ventral hernia repair in August 2019 with Dr. Jamison presents to ED with 2 day history of pain and erythema over umbilical surgical site.   s/p drainage and packing   continue antibiotics   f/u cultures

## 2019-10-12 NOTE — H&P ADULT - NSHPLABSRESULTS_GEN_ALL_CORE
Labs:  CAPILLARY BLOOD GLUCOSE                              12.5   9.48  )-----------( 322      ( 11 Oct 2019 18:50 )             38.3       Auto Neutrophil %: 75.5 % (10-11-19 @ 18:50)  Auto Immature Granulocyte %: 0.4 % (10-11-19 @ 18:50)    10-11    139  |  100  |  10  ----------------------------<  117<H>  4.3   |  27  |  0.9      Calcium, Total Serum: 9.7 mg/dL (10-11-19 @ 18:50)      LFTs:             6.7  | 0.4  | 15       ------------------[83      ( 11 Oct 2019 18:50 )  4.1  | x    | 18          Lipase:x      Amylase:x             Coags:     12.70  ----< 1.11    ( 11 Oct 2019 18:50 )     33.2        < from: CT Abdomen and Pelvis w/ Oral Cont and w/ IV Cont (10.11.19 @ 22:42) >  IMPRESSION:    5.5 x 5.0 x 1.5 cm incisional abscess with subcutaneous extension   involving the left rectus muscle; however, no definite evidence of   intra-abdominal extension.    < end of copied text >

## 2019-10-12 NOTE — DISCHARGE NOTE PROVIDER - HOSPITAL COURSE
79 year old male presented to ED with 2-3 day history of pain and erythema surrounding umbilical surgical site. Patient underwent ventral hernia repair in August 2019 (4x3; 3x2, 2x2 3 ventral defects. Underlay technique used with a single ventralight ST [id number]) no issues postoperatively seen in office doing well, has had erythema and swelling of incision over the last 2-3 days, fluctuant, indurated with area of cellulitis surrounding. WBC 9 on admission. CT with 5.5 x 5.0 x 1.5 cm collection to mesh with no intraabdominal component. Pt s/p  i & d with 20cc pus, cultured and copious washed out, cavity extends to mesh, packed. Packing changed today. VNS set up for dressings changes at home. Antibiotics and pain medications sent to pharmacy. patient remains vitally stable. Will follow up in the office in 1-2 weeks.

## 2019-10-12 NOTE — DISCHARGE NOTE PROVIDER - NSDCCPCAREPLAN_GEN_ALL_CORE_FT
PRINCIPAL DISCHARGE DIAGNOSIS  Diagnosis: Abscess  Assessment and Plan of Treatment: status post incision and drainage  -VNS set up for packing changes daily (pack periumbilical wound daily, cover with gauze and tape)  -Continue antibiotics as prescribed  -Take tylenol and motrin OTC for pain control as needed. Take oxycodone as prescribed only for severe pain/dressing changes. Please be advised this medication may cause drowsiness and constipation.  -Follow up in the office in 1-2 weeks, call [phone] for an appointment.   Seek medical attention or return to the emergency room if you experience new discharge/bleeding from wound sight, fevers, chills, inability to tolerate medications, nausea, vomiting, diarrhea, headache, dizziness or confusion.

## 2019-10-12 NOTE — H&P ADULT - HISTORY OF PRESENT ILLNESS
79 year old male presented to ED with 2 day history of pain and erythema surrounding umbilical surgical site. Patient underwent ventral hernia repair in August 2019 with Dr. Jamison and was seen in the office twice there after as well as his primary care provider at the end of September. Fluid collection was incised and drained with no complications.

## 2019-10-12 NOTE — PROGRESS NOTE ADULT - SUBJECTIVE AND OBJECTIVE BOX
GENERAL SURGERY PROGRESS NOTE     THERESE ALMANZA  79y  Male  Hospital day :1d    OVERNIGHT EVENTS: No acute events    T(F): 97.8 (10-11-19 @ 16:50), Max: 97.8 (10-11-19 @ 16:50)  HR: 74 (10-11-19 @ 16:50) (74 - 74)  BP: 157/70 (10-11-19 @ 16:50) (157/70 - 157/70)  RR: 18 (10-11-19 @ 16:50) (18 - 18)  SpO2: 95% (10-11-19 @ 16:50) (95% - 95%)      ABx: ampicillin/sulbactam  IVPB 3 Gram(s) IV Intermittent Once  vancomycin  IVPB 1000 milliGRAM(s) IV Intermittent once      PHYSICAL EXAM:  GENERAL: NAD, well-appearing  CHEST/LUNG: Clear to auscultation bilaterally  HEART: Regular rate and rhythm  ABDOMEN: Soft, Nontender, Nondistended;   EXTREMITIES:  No clubbing, cyanosis, or edema      LABS  Labs:  CAPILLARY BLOOD GLUCOSE                              12.5   9.48  )-----------( 322      ( 11 Oct 2019 18:50 )             38.3       Auto Neutrophil %: 75.5 % (10-11-19 @ 18:50)  Auto Immature Granulocyte %: 0.4 % (10-11-19 @ 18:50)    10-11    139  |  100  |  10  ----------------------------<  117<H>  4.3   |  27  |  0.9      Calcium, Total Serum: 9.7 mg/dL (10-11-19 @ 18:50)      LFTs:             6.7  | 0.4  | 15       ------------------[83      ( 11 Oct 2019 18:50 )  4.1  | x    | 18          Lipase:x      Amylase:x             Coags:     12.70  ----< 1.11    ( 11 Oct 2019 18:50 )     33.2                        RADIOLOGY & ADDITIONAL TESTS:      Pending CT scan

## 2019-10-12 NOTE — DISCHARGE NOTE PROVIDER - CARE PROVIDER_API CALL
Thom Jamison)  Surgery  [address] [place] [address], [address] [place] [address]  Phone: [phone]  Fax: [phone]  Follow Up Time: 1 week

## 2019-10-12 NOTE — PROCEDURE NOTE - NSPOSTCAREGUIDE_GEN_A_CORE
Keep the cast/splint/dressing clean and dry/Verbal/written post procedure instructions were given to patient/caregiver/Instructed patient/caregiver to follow-up with primary care physician/Instructed patient/caregiver regarding signs and symptoms of infection

## 2019-10-12 NOTE — H&P ADULT - ASSESSMENT
79 year old male with umbilical surgical site infection s/p ventral hernia repair in August 2019 presented with 2 days of erythema and tenderness. Fluid collection was incised and drained, packed and cultured for further evaluation.     - Continue IV antibiotics  - Follow up cultures  - Dressing change  - Regular diet, pain control 79 year old male with umbilical surgical site infection s/p ventral hernia repair in August 2019 presented with 2 days of erythema and tenderness. Fluid collection was incised and drained, packed and cultured for further evaluation.     - Continue IV antibiotics  - Follow up cultures  - Dressing change  - Regular diet, pain control    Senior Resident Note  80yo male sp apparent carolyn with reversal for diverticulitis 5 years ago with resultant ventral bowel containing hernia that was taken for open repair in august (4x3; 3x2, 2x2 3 ventral defects. Underlay technique used with a single ventralight ST [id number]) no issues postoperatively seen in office doing well  has had erythema and swelling of incision over the last 3 days, fluctuant, indurated with area of cellulitis surrounding wcc 9  ct with 5.5 x 5.0 x 1.5 cm collection to mesh with no intraabdominal component   i and d with 20cc pus, cultured and copious washed out, cavity extends to mesh, with suture, possible suture abscess - however suture not removed   will admit for iv abx, dressing changes   Pt seen and examined  Above note has been reviewed and edited  Plan d/w patient and Dr Shaheen Gibson

## 2019-10-14 ENCOUNTER — APPOINTMENT (OUTPATIENT)
Dept: SURGERY | Facility: CLINIC | Age: 79
End: 2019-10-14

## 2019-10-22 DIAGNOSIS — L76.82 OTHER POSTPROCEDURAL COMPLICATIONS OF SKIN AND SUBCUTANEOUS TISSUE: ICD-10-CM

## 2019-10-22 DIAGNOSIS — I25.10 ATHEROSCLEROTIC HEART DISEASE OF NATIVE CORONARY ARTERY WITHOUT ANGINA PECTORIS: ICD-10-CM

## 2019-10-22 DIAGNOSIS — L03.311 CELLULITIS OF ABDOMINAL WALL: ICD-10-CM

## 2019-10-22 DIAGNOSIS — I10 ESSENTIAL (PRIMARY) HYPERTENSION: ICD-10-CM

## 2019-10-22 DIAGNOSIS — L02.211 CUTANEOUS ABSCESS OF ABDOMINAL WALL: ICD-10-CM

## 2019-10-22 DIAGNOSIS — T81.43XA INFECTION FOLLOWING A PROCEDURE, ORGAN AND SPACE SURGICAL SITE, INITIAL ENCOUNTER: ICD-10-CM
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
## 2019-10-22 DIAGNOSIS — Z79.84 LONG TERM (CURRENT) USE OF ORAL HYPOGLYCEMIC DRUGS: ICD-10-CM

## 2019-10-22 DIAGNOSIS — E11.9 TYPE 2 DIABETES MELLITUS WITHOUT COMPLICATIONS: ICD-10-CM

## 2019-10-22 DIAGNOSIS — Z95.1 PRESENCE OF AORTOCORONARY BYPASS GRAFT: ICD-10-CM
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
## 2019-10-22 DIAGNOSIS — E78.5 HYPERLIPIDEMIA, UNSPECIFIED: ICD-10-CM

## 2019-10-24 ENCOUNTER — APPOINTMENT (OUTPATIENT)
Dept: SURGERY | Facility: CLINIC | Age: 79
End: 2019-10-24
Payer: MEDICARE

## 2019-10-24 VITALS
BODY MASS INDEX: 31.99 KG/M2 | DIASTOLIC BLOOD PRESSURE: 84 MMHG | SYSTOLIC BLOOD PRESSURE: 142 MMHG | WEIGHT: 216 LBS | HEIGHT: 69 IN

## 2019-10-24 PROCEDURE — 99024 POSTOP FOLLOW-UP VISIT: CPT

## 2019-10-24 NOTE — HISTORY OF PRESENT ILLNESS
[de-identified] : this patient was seen in the ed recently with drainage from the midline.  the wound was opened and a small stitch abscess was drained and he was given a course of antibiotics.  he returns now with the wound packed, good granulation tissue at the abscess and no erythema or exudates.  there is no evidence of active infection, and he can continue local wound care.  I will see him in 2 weeks to monitor the wound healing.

## 2019-11-07 ENCOUNTER — APPOINTMENT (OUTPATIENT)
Dept: SURGERY | Facility: CLINIC | Age: 79
End: 2019-11-07
Payer: COMMERCIAL

## 2019-11-07 VITALS
WEIGHT: 218 LBS | DIASTOLIC BLOOD PRESSURE: 68 MMHG | HEART RATE: 61 BPM | SYSTOLIC BLOOD PRESSURE: 142 MMHG | BODY MASS INDEX: 32.29 KG/M2 | HEIGHT: 69 IN

## 2019-11-07 DIAGNOSIS — K43.6 OTHER AND UNSPECIFIED VENTRAL HERNIA WITH OBSTRUCTION, W/OUT GANGRENE: ICD-10-CM

## 2019-11-07 PROCEDURE — 99212 OFFICE O/P EST SF 10 MIN: CPT

## 2019-11-07 NOTE — HISTORY OF PRESENT ILLNESS
[de-identified] : no wound complaints\par small non-epithelialized wound with no erythema, or exudates.  \par no evidence of active infection\par pt can follow-up prn

## 2019-12-04 ENCOUNTER — OUTPATIENT (OUTPATIENT)
Dept: OUTPATIENT SERVICES | Facility: HOSPITAL | Age: 79
LOS: 1 days | Discharge: HOME | End: 2019-12-04

## 2019-12-04 VITALS
SYSTOLIC BLOOD PRESSURE: 180 MMHG | TEMPERATURE: 98 F | HEIGHT: 69 IN | RESPIRATION RATE: 16 BRPM | HEART RATE: 70 BPM | WEIGHT: 214.95 LBS | DIASTOLIC BLOOD PRESSURE: 80 MMHG | OXYGEN SATURATION: 98 %

## 2019-12-04 DIAGNOSIS — Z01.818 ENCOUNTER FOR OTHER PREPROCEDURAL EXAMINATION: ICD-10-CM

## 2019-12-04 DIAGNOSIS — Z98.890 OTHER SPECIFIED POSTPROCEDURAL STATES: Chronic | ICD-10-CM

## 2019-12-04 DIAGNOSIS — G56.03 CARPAL TUNNEL SYNDROME, BILATERAL UPPER LIMBS: ICD-10-CM

## 2019-12-04 DIAGNOSIS — Z90.49 ACQUIRED ABSENCE OF OTHER SPECIFIED PARTS OF DIGESTIVE TRACT: Chronic | ICD-10-CM

## 2019-12-04 LAB
BASOPHILS # BLD AUTO: 0.04 K/UL — SIGNIFICANT CHANGE UP (ref 0–0.2)
BASOPHILS NFR BLD AUTO: 0.5 % — SIGNIFICANT CHANGE UP (ref 0–1)
EOSINOPHIL # BLD AUTO: 0.14 K/UL — SIGNIFICANT CHANGE UP (ref 0–0.7)
EOSINOPHIL NFR BLD AUTO: 1.9 % — SIGNIFICANT CHANGE UP (ref 0–8)
HCT VFR BLD CALC: 41.2 % — LOW (ref 42–52)
HGB BLD-MCNC: 13.6 G/DL — LOW (ref 14–18)
IMM GRANULOCYTES NFR BLD AUTO: 0.7 % — HIGH (ref 0.1–0.3)
LYMPHOCYTES # BLD AUTO: 1.76 K/UL — SIGNIFICANT CHANGE UP (ref 1.2–3.4)
LYMPHOCYTES # BLD AUTO: 23.8 % — SIGNIFICANT CHANGE UP (ref 20.5–51.1)
MCHC RBC-ENTMCNC: 29.8 PG — SIGNIFICANT CHANGE UP (ref 27–31)
MCHC RBC-ENTMCNC: 33 G/DL — SIGNIFICANT CHANGE UP (ref 32–37)
MCV RBC AUTO: 90.4 FL — SIGNIFICANT CHANGE UP (ref 80–94)
MONOCYTES # BLD AUTO: 0.53 K/UL — SIGNIFICANT CHANGE UP (ref 0.1–0.6)
MONOCYTES NFR BLD AUTO: 7.2 % — SIGNIFICANT CHANGE UP (ref 1.7–9.3)
NEUTROPHILS # BLD AUTO: 4.88 K/UL — SIGNIFICANT CHANGE UP (ref 1.4–6.5)
NEUTROPHILS NFR BLD AUTO: 65.9 % — SIGNIFICANT CHANGE UP (ref 42.2–75.2)
NRBC # BLD: 0 /100 WBCS — SIGNIFICANT CHANGE UP (ref 0–0)
PLATELET # BLD AUTO: 234 K/UL — SIGNIFICANT CHANGE UP (ref 130–400)
RBC # BLD: 4.56 M/UL — LOW (ref 4.7–6.1)
RBC # FLD: 13.9 % — SIGNIFICANT CHANGE UP (ref 11.5–14.5)
WBC # BLD: 7.4 K/UL — SIGNIFICANT CHANGE UP (ref 4.8–10.8)
WBC # FLD AUTO: 7.4 K/UL — SIGNIFICANT CHANGE UP (ref 4.8–10.8)

## 2019-12-04 NOTE — H&P PST ADULT - NSICDXPASTSURGICALHX_GEN_ALL_CORE_FT
PAST SURGICAL HISTORY:  H/O inguinal hernia repair     H/O ventral hernia repair     History of open heart surgery 5 vessel-CABG- 2006    History of partial colectomy DIVERTICULITIS    S/P lumbar spine operation

## 2019-12-04 NOTE — H&P PST ADULT - NSICDXPASTMEDICALHX_GEN_ALL_CORE_FT
PAST MEDICAL HISTORY:  Coronary artery disease     Diabetes mellitus     Diverticulitis     Dizzy spells At times    H/O nausea     History of diverticulosis     Hypertension     Numbness and tingling Both hand fingers-CT SYNDROME B/L HANDS    SOB (shortness of breath)

## 2019-12-04 NOTE — H&P PST ADULT - HISTORY OF PRESENT ILLNESS
79YR OLD MALE STATES HAS NUMBNESS OF BOTH HANDS-FINGERS FOR MANY YRS- PRESENTS TO PRETESTING FOR B/L CARPAL TUNNEL RELEASE. Denies c/o fever, rash, URI OR UTI SYMPTOMS. C/O SOB ON AND OFF  FOR AT LEAST 25YRS- S/P HERNIA REPAIR AUG 2019 DEVELOPED POST-OP INFECTION S/P I&D 10/19, RECD ABX Hence same surgery was cancelled in Nov 209 . Exercise elke 2-3 flat blocks, LTD BY SOB. 79YR OLD MALE STATES HAS NUMBNESS OF BOTH HANDS-FINGERS FOR MANY YRS- PRESENTS TO PRETESTING FOR B/L CARPAL TUNNEL RELEASE. Denies c/o fever, rash, URI OR UTI SYMPTOMS. C/O SOB ON AND OFF  FOR AT LEAST 25YRS- S/P HERNIA REPAIR AUG 2019 DEVELOPED POST-OP INFECTION S/P I&D 10/19, RECD ABX Hence same surgery was cancelled in Nov 2019 . Exercise elke 2-3 flat blocks, LTD BY SOB.

## 2019-12-09 NOTE — ED ADULT NURSE NOTE - NS ED NURSE RECORD ANOTHER VITAL SIGN
Called GI lab, they are aware 
Called and spoke to pt, she stated that she thought she was having an EGD and has not been following the diet for the colonoscopy, and needs to r/s  Colonoscopy r/s to 12/30/19 with Dr Sean Barron at [facility]  Suprep instructions sent to pt's home  Suprep called in to pt's pharmacy with coupon code 
Patients GI provider:  Dr Arnoldo Tripp    Number to return call: NA    Reason for call: Pt calling to r/s her colon procedure from today  Pt states she did not take her prep  Xfer's pt to Minoo Foss      Scheduled procedure/appointment date if applicable: Procedure - 12/09/19
Please return the pts call [phone]
Yes

## 2019-12-12 PROBLEM — K57.92 DIVERTICULITIS OF INTESTINE, PART UNSPECIFIED, WITHOUT PERFORATION OR ABSCESS WITHOUT BLEEDING: Chronic | Status: ACTIVE | Noted: 2019-12-04

## 2019-12-12 PROBLEM — R20.0 ANESTHESIA OF SKIN: Chronic | Status: ACTIVE | Noted: 2019-09-20

## 2019-12-18 NOTE — ASU PATIENT PROFILE, ADULT - PMH
Coronary artery disease    Diabetes mellitus    Diverticulitis    Dizzy spells  At times  H/O nausea    History of diverticulosis    Hypertension    Numbness and tingling  Both hand fingers-CT SYNDROME B/L HANDS  SOB (shortness of breath)

## 2019-12-18 NOTE — ASU PATIENT PROFILE, ADULT - PSH
H/O inguinal hernia repair    H/O ventral hernia repair    History of open heart surgery  5 vessel-CABG- 2006  History of partial colectomy  DIVERTICULITIS  S/P lumbar spine operation

## 2019-12-19 ENCOUNTER — OUTPATIENT (OUTPATIENT)
Dept: OUTPATIENT SERVICES | Facility: HOSPITAL | Age: 79
LOS: 1 days | Discharge: HOME | End: 2019-12-19

## 2019-12-19 VITALS
WEIGHT: 214.95 LBS | OXYGEN SATURATION: 95 % | HEART RATE: 61 BPM | TEMPERATURE: 99 F | HEIGHT: 69 IN | RESPIRATION RATE: 20 BRPM | DIASTOLIC BLOOD PRESSURE: 81 MMHG | SYSTOLIC BLOOD PRESSURE: 174 MMHG

## 2019-12-19 VITALS
HEART RATE: 66 BPM | OXYGEN SATURATION: 94 % | TEMPERATURE: 98 F | DIASTOLIC BLOOD PRESSURE: 79 MMHG | RESPIRATION RATE: 18 BRPM | SYSTOLIC BLOOD PRESSURE: 163 MMHG

## 2019-12-19 DIAGNOSIS — Z98.890 OTHER SPECIFIED POSTPROCEDURAL STATES: Chronic | ICD-10-CM

## 2019-12-19 DIAGNOSIS — Z90.49 ACQUIRED ABSENCE OF OTHER SPECIFIED PARTS OF DIGESTIVE TRACT: Chronic | ICD-10-CM

## 2019-12-19 RX ORDER — LEVOTHYROXINE SODIUM 125 MCG
0 TABLET ORAL
Qty: 90 | Refills: 0 | DISCHARGE

## 2019-12-19 RX ORDER — RAMIPRIL 5 MG
1 CAPSULE ORAL
Qty: 0 | Refills: 0 | DISCHARGE

## 2019-12-19 RX ORDER — METFORMIN HYDROCHLORIDE 850 MG/1
1 TABLET ORAL
Qty: 0 | Refills: 0 | DISCHARGE

## 2019-12-19 RX ORDER — ATORVASTATIN CALCIUM 80 MG/1
0 TABLET, FILM COATED ORAL
Qty: 90 | Refills: 0 | DISCHARGE

## 2019-12-19 RX ORDER — ASPIRIN/CALCIUM CARB/MAGNESIUM 324 MG
1 TABLET ORAL
Qty: 0 | Refills: 0 | DISCHARGE

## 2019-12-19 NOTE — ASU DISCHARGE PLAN (ADULT/PEDIATRIC) - CARE PROVIDER_API CALL
Erlinda Carroll (MD)  Surgery of the Hand  [address] [place] [address]  Phone: [phone]  Fax: [phone]  Follow Up Time:

## 2019-12-26 DIAGNOSIS — I25.10 ATHEROSCLEROTIC HEART DISEASE OF NATIVE CORONARY ARTERY WITHOUT ANGINA PECTORIS: ICD-10-CM

## 2019-12-26 DIAGNOSIS — G56.03 CARPAL TUNNEL SYNDROME, BILATERAL UPPER LIMBS: ICD-10-CM

## 2019-12-26 DIAGNOSIS — I10 ESSENTIAL (PRIMARY) HYPERTENSION: ICD-10-CM

## 2019-12-26 DIAGNOSIS — E11.9 TYPE 2 DIABETES MELLITUS WITHOUT COMPLICATIONS: ICD-10-CM

## 2020-09-15 NOTE — REASON FOR VISIT
How Severe Is Your Scar?: moderate Is This A New Presentation, Or A Follow-Up?: Scar [Post Op: _________] : a [unfilled] post op visit

## 2021-02-16 ENCOUNTER — APPOINTMENT (OUTPATIENT)
Dept: CARDIOLOGY | Facility: CLINIC | Age: 81
End: 2021-02-16

## 2021-09-17 ENCOUNTER — EMERGENCY (EMERGENCY)
Facility: HOSPITAL | Age: 81
LOS: 0 days | Discharge: HOME | End: 2021-09-18
Attending: EMERGENCY MEDICINE | Admitting: EMERGENCY MEDICINE
Payer: COMMERCIAL

## 2021-09-17 VITALS
DIASTOLIC BLOOD PRESSURE: 77 MMHG | RESPIRATION RATE: 16 BRPM | TEMPERATURE: 98 F | HEIGHT: 69 IN | WEIGHT: 203.93 LBS | SYSTOLIC BLOOD PRESSURE: 146 MMHG | HEART RATE: 70 BPM | OXYGEN SATURATION: 94 %

## 2021-09-17 DIAGNOSIS — Z90.49 ACQUIRED ABSENCE OF OTHER SPECIFIED PARTS OF DIGESTIVE TRACT: Chronic | ICD-10-CM

## 2021-09-17 DIAGNOSIS — Z98.890 OTHER SPECIFIED POSTPROCEDURAL STATES: Chronic | ICD-10-CM

## 2021-09-17 DIAGNOSIS — R07.89 OTHER CHEST PAIN: ICD-10-CM

## 2021-09-17 DIAGNOSIS — Z95.1 PRESENCE OF AORTOCORONARY BYPASS GRAFT: ICD-10-CM

## 2021-09-17 DIAGNOSIS — R07.81 PLEURODYNIA: ICD-10-CM

## 2021-09-17 DIAGNOSIS — K57.32 DIVERTICULITIS OF LARGE INTESTINE WITHOUT PERFORATION OR ABSCESS WITHOUT BLEEDING: ICD-10-CM

## 2021-09-17 DIAGNOSIS — R20.2 PARESTHESIA OF SKIN: ICD-10-CM

## 2021-09-17 DIAGNOSIS — Z90.49 ACQUIRED ABSENCE OF OTHER SPECIFIED PARTS OF DIGESTIVE TRACT: ICD-10-CM

## 2021-09-17 DIAGNOSIS — Z79.84 LONG TERM (CURRENT) USE OF ORAL HYPOGLYCEMIC DRUGS: ICD-10-CM

## 2021-09-17 DIAGNOSIS — I25.10 ATHEROSCLEROTIC HEART DISEASE OF NATIVE CORONARY ARTERY WITHOUT ANGINA PECTORIS: ICD-10-CM

## 2021-09-17 DIAGNOSIS — I10 ESSENTIAL (PRIMARY) HYPERTENSION: ICD-10-CM

## 2021-09-17 DIAGNOSIS — E11.9 TYPE 2 DIABETES MELLITUS WITHOUT COMPLICATIONS: ICD-10-CM

## 2021-09-17 DIAGNOSIS — R07.9 CHEST PAIN, UNSPECIFIED: ICD-10-CM

## 2021-09-17 DIAGNOSIS — Z20.822 CONTACT WITH AND (SUSPECTED) EXPOSURE TO COVID-19: ICD-10-CM

## 2021-09-17 DIAGNOSIS — R07.1 CHEST PAIN ON BREATHING: ICD-10-CM

## 2021-09-17 LAB
ALBUMIN SERPL ELPH-MCNC: 4.2 G/DL — SIGNIFICANT CHANGE UP (ref 3.5–5.2)
ALP SERPL-CCNC: 80 U/L — SIGNIFICANT CHANGE UP (ref 30–115)
ALT FLD-CCNC: 10 U/L — SIGNIFICANT CHANGE UP (ref 0–41)
ANION GAP SERPL CALC-SCNC: 16 MMOL/L — HIGH (ref 7–14)
AST SERPL-CCNC: 15 U/L — SIGNIFICANT CHANGE UP (ref 0–41)
BASOPHILS # BLD AUTO: 0.02 K/UL — SIGNIFICANT CHANGE UP (ref 0–0.2)
BASOPHILS NFR BLD AUTO: 0.2 % — SIGNIFICANT CHANGE UP (ref 0–1)
BILIRUB SERPL-MCNC: 0.6 MG/DL — SIGNIFICANT CHANGE UP (ref 0.2–1.2)
BUN SERPL-MCNC: 14 MG/DL — SIGNIFICANT CHANGE UP (ref 10–20)
CALCIUM SERPL-MCNC: 9.7 MG/DL — SIGNIFICANT CHANGE UP (ref 8.5–10.1)
CHLORIDE SERPL-SCNC: 104 MMOL/L — SIGNIFICANT CHANGE UP (ref 98–110)
CO2 SERPL-SCNC: 20 MMOL/L — SIGNIFICANT CHANGE UP (ref 17–32)
CREAT SERPL-MCNC: 1.1 MG/DL — SIGNIFICANT CHANGE UP (ref 0.7–1.5)
EOSINOPHIL # BLD AUTO: 0.02 K/UL — SIGNIFICANT CHANGE UP (ref 0–0.7)
EOSINOPHIL NFR BLD AUTO: 0.2 % — SIGNIFICANT CHANGE UP (ref 0–8)
GLUCOSE SERPL-MCNC: 140 MG/DL — HIGH (ref 70–99)
HCT VFR BLD CALC: 42.1 % — SIGNIFICANT CHANGE UP (ref 42–52)
HGB BLD-MCNC: 14.1 G/DL — SIGNIFICANT CHANGE UP (ref 14–18)
IMM GRANULOCYTES NFR BLD AUTO: 0.2 % — SIGNIFICANT CHANGE UP (ref 0.1–0.3)
LYMPHOCYTES # BLD AUTO: 0.91 K/UL — LOW (ref 1.2–3.4)
LYMPHOCYTES # BLD AUTO: 10.4 % — LOW (ref 20.5–51.1)
MCHC RBC-ENTMCNC: 30.3 PG — SIGNIFICANT CHANGE UP (ref 27–31)
MCHC RBC-ENTMCNC: 33.5 G/DL — SIGNIFICANT CHANGE UP (ref 32–37)
MCV RBC AUTO: 90.3 FL — SIGNIFICANT CHANGE UP (ref 80–94)
MONOCYTES # BLD AUTO: 0.78 K/UL — HIGH (ref 0.1–0.6)
MONOCYTES NFR BLD AUTO: 8.9 % — SIGNIFICANT CHANGE UP (ref 1.7–9.3)
NEUTROPHILS # BLD AUTO: 6.98 K/UL — HIGH (ref 1.4–6.5)
NEUTROPHILS NFR BLD AUTO: 80.1 % — HIGH (ref 42.2–75.2)
NRBC # BLD: 0 /100 WBCS — SIGNIFICANT CHANGE UP (ref 0–0)
PLATELET # BLD AUTO: 244 K/UL — SIGNIFICANT CHANGE UP (ref 130–400)
POTASSIUM SERPL-MCNC: 4.4 MMOL/L — SIGNIFICANT CHANGE UP (ref 3.5–5)
POTASSIUM SERPL-SCNC: 4.4 MMOL/L — SIGNIFICANT CHANGE UP (ref 3.5–5)
PROT SERPL-MCNC: 6.5 G/DL — SIGNIFICANT CHANGE UP (ref 6–8)
RBC # BLD: 4.66 M/UL — LOW (ref 4.7–6.1)
RBC # FLD: 13.2 % — SIGNIFICANT CHANGE UP (ref 11.5–14.5)
SARS-COV-2 RNA SPEC QL NAA+PROBE: SIGNIFICANT CHANGE UP
SODIUM SERPL-SCNC: 140 MMOL/L — SIGNIFICANT CHANGE UP (ref 135–146)
TROPONIN T SERPL-MCNC: <0.01 NG/ML — SIGNIFICANT CHANGE UP
TROPONIN T SERPL-MCNC: <0.01 NG/ML — SIGNIFICANT CHANGE UP
WBC # BLD: 8.73 K/UL — SIGNIFICANT CHANGE UP (ref 4.8–10.8)
WBC # FLD AUTO: 8.73 K/UL — SIGNIFICANT CHANGE UP (ref 4.8–10.8)

## 2021-09-17 PROCEDURE — 99220: CPT | Mod: CS

## 2021-09-17 PROCEDURE — 71046 X-RAY EXAM CHEST 2 VIEWS: CPT | Mod: 26

## 2021-09-17 PROCEDURE — 93010 ELECTROCARDIOGRAM REPORT: CPT | Mod: 76

## 2021-09-17 RX ORDER — ASPIRIN/CALCIUM CARB/MAGNESIUM 324 MG
324 TABLET ORAL ONCE
Refills: 0 | Status: COMPLETED | OUTPATIENT
Start: 2021-09-17 | End: 2021-09-17

## 2021-09-17 RX ORDER — ADENOSINE 3 MG/ML
60 INJECTION INTRAVENOUS ONCE
Refills: 0 | Status: DISCONTINUED | OUTPATIENT
Start: 2021-09-17 | End: 2021-09-17

## 2021-09-17 RX ORDER — METHOCARBAMOL 500 MG/1
1000 TABLET, FILM COATED ORAL ONCE
Refills: 0 | Status: COMPLETED | OUTPATIENT
Start: 2021-09-17 | End: 2021-09-17

## 2021-09-17 RX ADMIN — Medication 324 MILLIGRAM(S): at 12:33

## 2021-09-17 RX ADMIN — METHOCARBAMOL 1000 MILLIGRAM(S): 500 TABLET, FILM COATED ORAL at 12:33

## 2021-09-17 NOTE — ED ADULT NURSE NOTE - NSIMPLEMENTINTERV_GEN_ALL_ED
Implemented All Universal Safety Interventions:  [place] to call system. Call bell, personal items and telephone within reach. Instruct patient to call for assistance. Room bathroom lighting operational. Non-slip footwear when patient is off stretcher. Physically safe environment: no spills, clutter or unnecessary equipment. Stretcher in lowest position, wheels locked, appropriate side rails in place.

## 2021-09-17 NOTE — ED CDU PROVIDER INITIAL DAY NOTE - PROGRESS NOTE DETAILS
Pharm stress test ordered received signout from Cameron Pena pt in obs for chest pain; last stress test here 2014; cardiologist Dr. Diez; ce/ekg x2 unchanged; covid neg; will continue to monitor;

## 2021-09-17 NOTE — ED PROVIDER NOTE - ATTENDING CONTRIBUTION TO CARE
82 yo m with pmh of cad with cabg, dm, htn, ventral hernia s/p repair, presents with c/o L chest wall pain.  pt says started x 1 week.  tried motrin without relief.  pt says pain is exacerbated by inspiration and certain movement.  pt called his pmd who told him to go to ED because he doesn't' have "all the tools" that he needs to check his heart.  used to see dr. navas for cardio but has been lost to f/u exam: nad, ncat, perrl, eomi, mmm, rrr, ctab, nontender chest wall, pain illicited on arm movement, abd soft, nt,nd, surgical scar, aox3, imp: pt with h/o cad with cabg, here with L chest wall pain, will r/o acs, ekg, cxr, labs

## 2021-09-17 NOTE — ED CDU PROVIDER INITIAL DAY NOTE - OBJECTIVE STATEMENT
82 yo M hx CABG, DM, HTN c/o intermittent left sided chest pain x 1 week. Pain is moderate in severity and worse with movement and inspiration. No SOB, n/v, dizzy, or abdominal pains.

## 2021-09-17 NOTE — ED ADULT NURSE REASSESSMENT NOTE - NS ED NURSE REASSESS COMMENT FT1
patient received from previous RN. Patient in observation for chest pain.  Patient remains on cardiac monitor.  Comfort measures made. Patient offers no complaints. Will continue to monitor.

## 2021-09-17 NOTE — ED PROVIDER NOTE - OBJECTIVE STATEMENT
82 y/o M, PMHx CAD- s/p CABG (2006), DM, HLD, Hypothyroidism & HTN, presents to the ED with complaints of left sided chest discomfort x one week. He is attributing his pain to a "muscle pull" as he has experienced similar symptoms in the past. He admits to exacerbation of pain upon movement; denies dyspnea, nausea,  vomiting, back pain, abdominal pain, fever, chills & recent illness. His cardiologist is Dr. Martin

## 2021-09-17 NOTE — ED CDU PROVIDER INITIAL DAY NOTE - ATTENDING CONTRIBUTION TO CARE
Pt presents with chest pain with movement for about one weeks. States his left lower chest hurts when he sits from a laying position. Hx of leaky valve and CABG. States when he had this before he had taken prednisone. On exam S1S2 mid systolic murmur, lungs clear, abdomen is soft nontender, ext neg for edema or tenderness. No rash. placed into observation for stress testing.

## 2021-09-17 NOTE — ED ADULT NURSE NOTE - OBJECTIVE STATEMENT
Patient c/o left sided chest pain worsening on deep breathing x 1 week, patient states he has PMH of CABG in 2006. Denies nausea/vomiting/fever/chills/SOB at this time. CM in place. No s/s of distress noted.

## 2021-09-18 VITALS
DIASTOLIC BLOOD PRESSURE: 76 MMHG | RESPIRATION RATE: 18 BRPM | OXYGEN SATURATION: 100 % | HEART RATE: 77 BPM | SYSTOLIC BLOOD PRESSURE: 147 MMHG | TEMPERATURE: 98 F

## 2021-09-18 PROCEDURE — 93018 CV STRESS TEST I&R ONLY: CPT

## 2021-09-18 PROCEDURE — 99217: CPT | Mod: CS

## 2021-09-18 PROCEDURE — 93016 CV STRESS TEST SUPVJ ONLY: CPT

## 2021-09-18 PROCEDURE — 93306 TTE W/DOPPLER COMPLETE: CPT | Mod: 26

## 2021-09-18 PROCEDURE — 78452 HT MUSCLE IMAGE SPECT MULT: CPT | Mod: 26,MA

## 2021-09-18 RX ORDER — ACETAMINOPHEN 500 MG
975 TABLET ORAL ONCE
Refills: 0 | Status: COMPLETED | OUTPATIENT
Start: 2021-09-18 | End: 2021-09-18

## 2021-09-18 RX ADMIN — Medication 975 MILLIGRAM(S): at 12:27

## 2021-09-18 RX ADMIN — Medication 40 MILLIGRAM(S): at 14:16

## 2021-09-18 NOTE — ED CDU PROVIDER DISPOSITION NOTE - CARE PROVIDER_API CALL
Enzo Heller  Internal Medicine  [address], [address]  [place] [address]  Phone: [phone]  Fax: [phone]  Follow Up Time: 1-3 Days    Arthur Martin)  Cardiovascular Disease; Internal Medicine; Nuclear Cardiology  [address] [place] [address], [address]  [place] [address]  Phone: [phone]  Fax: [phone]  Follow Up Time: 1-3 Days

## 2021-09-18 NOTE — ED CDU PROVIDER SUBSEQUENT DAY NOTE - NS_EDPROVIDERDISPOUSERTYPE_ED_A_ED
[address]                 [address] [address]                                OPERATIVE REPORT    PATIENT NAME: Sary Floyd                 :        1961  MED REC NO:   [id number]                          ROOM:       [id number]  ACCOUNT NO:   [de-identified]                           ADMIT DATE: 2019  PROVIDER:     Vincenzo Constantino MD    DATE OF PROCEDURE:  2019    PREOPERATIVE DIAGNOSIS:  Small bowel obstruction secondary to  incarcerated right inguinal hernia. POSTOPERATIVE DIAGNOSIS:  Small bowel obstruction secondary to  incarcerated right inguinal hernia with acutely ischemic perforated  small bowel. PROCEDURE PERFORMED:  Right inguinal hernia repair with small bowel  resection. SURGEON:  Vincenzo Constantino MD    ANESTHESIA:  General.    INDICATIONS:  The patient is a 53-year-old woman, who presented with  right groin pain, nausea, and vomiting. She was found to have an  incarcerated right inguinal hernia. OPERATIVE SUMMARY:  After preoperative evaluation, the patient was  brought into the operating suite and placed in a comfortable supine  position on the operating room table. Monitoring equipment was attached  and general anesthesia was induced. Her right groin and abdomen were  sterilely prepped and draped, and an incision was made over the right  groin. This was dissected down to the external oblique fascia, which  was opened in direction parallel with its fibers. Deep to this, we  identified the sac, and there was some necrotic appearing tissue and  some purulent drainage. The sac was opened and a small knuckle of small  bowel was identified with a frankly necrotic apex. The defect in the  fourth inguinal canal was enlarged, and a small bowel was delivered  through this. The proximal and distal small bowel were unremarkable. They were secured and scattered with silk sutures, and the enterotomies  were made.   A side-to-side, functional end-to-end anastomosis was  created with the firing of a MARINE stapler and closed off with the second  firing of the stapler, effectively excising the ischemic small bowel  segment. The corners, apex, and staple line intersection were all  oversewn with silk sutures. The anastomosis was palpated and felt to be  widely patent and nicely intact with no sign of ischemia. This was  returned to the peritoneal cavity. The peritoneum was closed with a  running suture of 2-0 silk. Some necrotic tissue from the floor of the  inguinal canal was excised, and the floor of the inguinal canal was then  reapproximated with running suture of 2-0 Prolene. The pelvic shelving  edge was then approximated at the inferior edge of the muscle layer in  order to close the hernia defect. The external oblique fascia was then  closed with a running suture of 0 Vicryl. The subcutaneous tissues were  irrigated and injected with Marcaine. A 10 mL flat channel drain was  brought out through a lateral stab incision and placed over the external  oblique fascia. The subcutaneous tissues were then approximated with  silk sutures and the skin was closed loosely with staples. Dry  dressings were applied. All sponge, needle, and instrument counts were  correct at the end of the case. The patient tolerated the procedure  well and was taken to recovery area in stable condition.         Shanon Zambrano MD    D: 06/20/2019 19:14:24       T: 06/20/2019 19:22:58     [email or web address]  Job#: [id number]     Doc#: [id number]    CC: Attending Attestation (For Attendings USE Only)...

## 2021-09-18 NOTE — ED ADULT NURSE REASSESSMENT NOTE - NS ED NURSE REASSESS COMMENT FT1
Pt reassessed A/O times 4 Vs retaken stable report filling the same localized pain B/L abdomen lower chest through the middle back pain on activity ED attending made aware Tylenol 650 mg po order is given ,lunch meal provide did eat 100% placed OOB-chair with 2 person assist on going nursing observation .

## 2021-09-18 NOTE — ED ADULT NURSE REASSESSMENT NOTE - NS ED NURSE REASSESS COMMENT FT1
patient sleeping with no difficulty breathing noted. Patient remains on cardiac monitor.  Will continue to monitor.

## 2021-09-18 NOTE — ED ADULT NURSE REASSESSMENT NOTE - NS ED NURSE REASSESS COMMENT FT1
Pt reassessed A/O times 4 VS stable report filling slight better after Tylenol 650 mg  po is given ,pt is seen evaluate by Ed attending stress test order is done completed ,pt  clear to go home ready to be D/C home with care services .

## 2021-09-18 NOTE — ED CDU PROVIDER DISPOSITION NOTE - CLINICAL COURSE
Pt presented with chest pain. Hx of CABG. CE neg. Stress neg. Echo with no problem to explain chest pain. Pts had reproducible tenderness to the lower ribs. In the past he had the same and insisted that he was given prednisone which helped the pain. Will give a short course of prednisone.

## 2021-09-18 NOTE — ED CDU PROVIDER DISPOSITION NOTE - PATIENT PORTAL LINK FT
You can access the FollowMyHealth Patient Portal offered by [facility] by registering at the following website: [email or web address]. By joining [facility]’s FollowMyHealth portal, you will also be able to view your health information using other applications (apps) compatible with our system.

## 2021-09-18 NOTE — ED CDU PROVIDER DISPOSITION NOTE - PROVIDER TOKENS
PROVIDER:[TOKEN:[[id number]],FOLLOWUP:[1-3 Days]],PROVIDER:[TOKEN:[[id number]],FOLLOWUP:[1-3 Days]]

## 2021-09-18 NOTE — ED CDU PROVIDER SUBSEQUENT DAY NOTE - HISTORY
pt resting in obs without complaints; 1st part of stress test completed; 2nd part in am; will continue to follow;

## 2021-09-18 NOTE — ED ADULT NURSE REASSESSMENT NOTE - NS ED NURSE REASSESS COMMENT FT1
Pt received from previous RN A/O times 4 Vs stable on cardiac monitor C/O left side back pain comfort provide on the bed denies pain medication MD on call made aware ,pt is seen evaluate by ED attending with order for Stress test waiting for stress test

## 2021-09-24 ENCOUNTER — EMERGENCY (EMERGENCY)
Facility: HOSPITAL | Age: 81
LOS: 0 days | Discharge: HOME | End: 2021-09-24
Attending: EMERGENCY MEDICINE | Admitting: EMERGENCY MEDICINE
Payer: MEDICARE

## 2021-09-24 VITALS
HEART RATE: 87 BPM | WEIGHT: 179.9 LBS | HEIGHT: 69 IN | RESPIRATION RATE: 18 BRPM | TEMPERATURE: 97 F | DIASTOLIC BLOOD PRESSURE: 75 MMHG | SYSTOLIC BLOOD PRESSURE: 150 MMHG

## 2021-09-24 DIAGNOSIS — Z79.899 OTHER LONG TERM (CURRENT) DRUG THERAPY: ICD-10-CM

## 2021-09-24 DIAGNOSIS — Z90.49 ACQUIRED ABSENCE OF OTHER SPECIFIED PARTS OF DIGESTIVE TRACT: Chronic | ICD-10-CM

## 2021-09-24 DIAGNOSIS — E11.9 TYPE 2 DIABETES MELLITUS WITHOUT COMPLICATIONS: ICD-10-CM

## 2021-09-24 DIAGNOSIS — Z87.2 PERSONAL HISTORY OF DISEASES OF THE SKIN AND SUBCUTANEOUS TISSUE: ICD-10-CM

## 2021-09-24 DIAGNOSIS — I10 ESSENTIAL (PRIMARY) HYPERTENSION: ICD-10-CM

## 2021-09-24 DIAGNOSIS — Z90.89 ACQUIRED ABSENCE OF OTHER ORGANS: ICD-10-CM

## 2021-09-24 DIAGNOSIS — Z87.891 PERSONAL HISTORY OF NICOTINE DEPENDENCE: ICD-10-CM

## 2021-09-24 DIAGNOSIS — Z98.890 OTHER SPECIFIED POSTPROCEDURAL STATES: Chronic | ICD-10-CM

## 2021-09-24 DIAGNOSIS — R04.0 EPISTAXIS: ICD-10-CM

## 2021-09-24 DIAGNOSIS — Z79.84 LONG TERM (CURRENT) USE OF ORAL HYPOGLYCEMIC DRUGS: ICD-10-CM

## 2021-09-24 DIAGNOSIS — I25.10 ATHEROSCLEROTIC HEART DISEASE OF NATIVE CORONARY ARTERY WITHOUT ANGINA PECTORIS: ICD-10-CM

## 2021-09-24 DIAGNOSIS — Z95.5 PRESENCE OF CORONARY ANGIOPLASTY IMPLANT AND GRAFT: ICD-10-CM

## 2021-09-24 DIAGNOSIS — Z87.19 PERSONAL HISTORY OF OTHER DISEASES OF THE DIGESTIVE SYSTEM: ICD-10-CM

## 2021-09-24 DIAGNOSIS — Z87.39 PERSONAL HISTORY OF OTHER DISEASES OF THE MUSCULOSKELETAL SYSTEM AND CONNECTIVE TISSUE: ICD-10-CM

## 2021-09-24 DIAGNOSIS — E03.9 HYPOTHYROIDISM, UNSPECIFIED: ICD-10-CM

## 2021-09-24 DIAGNOSIS — E78.5 HYPERLIPIDEMIA, UNSPECIFIED: ICD-10-CM

## 2021-09-24 DIAGNOSIS — Z79.890 HORMONE REPLACEMENT THERAPY: ICD-10-CM

## 2021-09-24 PROCEDURE — 99283 EMERGENCY DEPT VISIT LOW MDM: CPT

## 2021-09-24 NOTE — ED ADULT TRIAGE NOTE - CHIEF COMPLAINT QUOTE
Patient c/o nose bleed that began at 7 am this morning and only subsided upon arrival to ed . Patient denies Blood thinners but is on aspirin

## 2021-09-24 NOTE — ED PROVIDER NOTE - PHYSICAL EXAMINATION
Constitutional: Well appearing. No acute distress. Non toxic.   Eyes: PERRLA. Extraocular movements intact, no entrapment. Conjunctiva normal.   ENT: No nasal discharge. Moist mucus membranes. +dried blood to L nare. no active bleeding  no blood in posterior oropharynx  Neck: Supple, non tender, full range of motion.     Pulm:  Normal work of breathing.  Abd: soft NT ND +BS.   Ext: Warm and well perfused x4, moving all extremities, no edema.   Psy: Cooperative, appropriate.   Skin: Warm, dry, no rash  Neuro: CN2-12 grossly intact no sensory or motor deficits throughout, no drift, no ataxia

## 2021-09-24 NOTE — ED PROVIDER NOTE - NSFOLLOWUPCLINICS_GEN_ALL_ED_FT
[facility]  ENT  [address], [address]nd [place] [address]  Phone: [phone]  Fax:

## 2021-09-24 NOTE — ED PROVIDER NOTE - PATIENT PORTAL LINK FT
You can access the FollowMyHealth Patient Portal offered by [facility] by registering at the following website: [email or web address]. By joining [facility]’s FollowMyHealth portal, you will also be able to view your health information using other applications (apps) compatible with our system.

## 2021-09-24 NOTE — ED PROVIDER NOTE - CLINICAL SUMMARY MEDICAL DECISION MAKING FREE TEXT BOX
80yo M history of DM DL Hypothyroidism CAD CABG presenting with epistaxis, spontaneous, since this AM- unsure which nare, called EMS, resolved by time of arrival. No AC. no falls/trauma. no other assoc sx. sx sudden onset, moderate. no re bleed here. Comfortable with discharge and follow-up outpatient, strict return precautions given. Endorses understanding of all of this and aware that they can return at any time for new or concerning symptoms. No further questions or concerns at this time

## 2021-09-24 NOTE — ED PROVIDER NOTE - OBJECTIVE STATEMENT
82yo M history of DM DL Hypothyroidism CAD CABG presenting with epistaxis, spontaneous, since this AM- unsure which nare, called EMS, resolved by time of arrival. No AC. no falls/trauma. no other assoc sx. sx sudden onset, moderate.

## 2021-09-24 NOTE — ED ADULT NURSE NOTE - OBJECTIVE STATEMENT
Patient c/o nose bleed since this morning, patient denies AC patient is on ASA. Denies trauma or recent fall. On assessment mild bleeding present to nares. No s/s of resp distress noted at this time.

## 2021-09-24 NOTE — ED PROVIDER NOTE - PROGRESS NOTE DETAILS
pt also noting chronic lower abdominal intermittent discomfort and frequent flatus- has GI follow-up, scheduled for gastric emptying study and CTAP IV/PO outpatient, abdomen s nt nd. no vomiting. will defer outpt. observed in ED x1hr with no re bleed. Comfortable with discharge and follow-up outpatient, strict return precautions given. Endorses understanding of all of this and aware that they can return at any time for new or concerning symptoms. No further questions or concerns at this time

## 2021-10-02 ENCOUNTER — LABORATORY RESULT (OUTPATIENT)
Age: 81
End: 2021-10-02

## 2021-10-02 ENCOUNTER — OUTPATIENT (OUTPATIENT)
Dept: OUTPATIENT SERVICES | Facility: HOSPITAL | Age: 81
LOS: 1 days | Discharge: HOME | End: 2021-10-02

## 2021-10-02 DIAGNOSIS — Z98.890 OTHER SPECIFIED POSTPROCEDURAL STATES: Chronic | ICD-10-CM

## 2021-10-02 DIAGNOSIS — Z90.49 ACQUIRED ABSENCE OF OTHER SPECIFIED PARTS OF DIGESTIVE TRACT: Chronic | ICD-10-CM

## 2021-10-02 DIAGNOSIS — Z11.59 ENCOUNTER FOR SCREENING FOR OTHER VIRAL DISEASES: ICD-10-CM

## 2021-10-05 ENCOUNTER — OUTPATIENT (OUTPATIENT)
Dept: OUTPATIENT SERVICES | Facility: HOSPITAL | Age: 81
LOS: 1 days | Discharge: HOME | End: 2021-10-05
Payer: MEDICARE

## 2021-10-05 ENCOUNTER — RESULT REVIEW (OUTPATIENT)
Age: 81
End: 2021-10-05

## 2021-10-05 DIAGNOSIS — Z98.890 OTHER SPECIFIED POSTPROCEDURAL STATES: Chronic | ICD-10-CM

## 2021-10-05 DIAGNOSIS — Z90.49 ACQUIRED ABSENCE OF OTHER SPECIFIED PARTS OF DIGESTIVE TRACT: Chronic | ICD-10-CM

## 2021-10-05 DIAGNOSIS — K21.9 GASTRO-ESOPHAGEAL REFLUX DISEASE WITHOUT ESOPHAGITIS: ICD-10-CM

## 2021-10-05 PROCEDURE — 78264 GASTRIC EMPTYING IMG STUDY: CPT | Mod: 26,MH

## 2021-12-06 NOTE — ED ADULT NURSE NOTE - NS_SISCREENINGSR_GEN_ALL_ED
Goal Outcome Evaluation:  Plan of Care Reviewed With: patient, durable power of         Progress: no change   SLP treatment completed. Will continue to address cognitive-communication impairments in tx. Please see note for further details and recommendations.     Negative

## 2022-09-04 NOTE — PATIENT PROFILE ADULT - DO YOU WANT TO COMPLETE THE HCP AND NAME A HEALTH CARE AGENT
no 46 old, single male.  Patient has PPH of schizoaffective disorder, and cannabis use.  Patient has one previous hospitalization.  Patient was BIB  nypd in handcuffs; activated by mother for agitation and worsening paranoia and AH. admitted to [facility] on a 9.39 legal status.     Plan:  >Legal: 9.39  >Obs: Routine, discontinue CO  >Psychiatric Meds: Restart outpatient medication regimen: Risperdal 1mg BID. Observe for tolerability and efficacy. Patient had been poorly adherent prior to admission.   PRN medications:  Ativan 2mg oral Q6HR PRN for agitation and anxiety.  Haldol 5mg oral Q6HR PRN for agitation.   Benadryl 50mg oral Q6HR PRN for agitation.   Vistaril 50mg oral Q6HR PRN for anxiety.  Desyrel 50mg oral QHS PRN for insomnia.   >Labs: Admission labs reviewed, no acute findings. Labs pending for tomorrow: WBC, A1c and Lipid panel. Hold antipsychotics if QTc >500  >Medical:   No acute concerns. No consultations needed at this time. No indication for CIWA. Patient with consistently stable VS, no visible physical symptoms of withdrawal.   During the course of treatment, will collaborate with medical team to manage medical issues.  >Diet: Regular  >Social: milieu/structured therapy  >Treatment Interventions: Groups and Individual Therapy/CBT, Motivational counseling for substance abuse related issues.   >Dispo: Collateral and dispo planning pending further symptom and medication optimization       46 old, single male.  Patient has PPH of schizoaffective disorder, and cannabis use.  Patient has one previous hospitalization.  Patient was BIB  nypd in handcuffs; activated by mother for agitation and worsening paranoia and AH. admitted to [facility] on a 9.39 legal status.     Plan:  >Legal: 9.39  >Obs: Routine,   >Psychiatric Meds: Restart outpatient medication regimen: Risperdal 1mg BID. Observe for tolerability and efficacy. Patient had been poorly adherent prior to admission.   PRN medications:  Ativan 2mg oral Q6HR PRN for agitation and anxiety.  Haldol 5mg oral Q6HR PRN for agitation.   Benadryl 50mg oral Q6HR PRN for agitation.   Vistaril 50mg oral Q6HR PRN for anxiety.  Desyrel 50mg oral QHS PRN for insomnia.   >Labs: Admission labs reviewed, no acute findings. Labs pending for tomorrow: WBC, A1c and Lipid panel. Hold antipsychotics if QTc >500  >Medical:   No acute concerns. No consultations needed at this time. No indication for CIWA. Patient with consistently stable VS, no visible physical symptoms of withdrawal.   During the course of treatment, will collaborate with medical team to manage medical issues.  >Diet: Regular  >Social: milieu/structured therapy  >Treatment Interventions: Groups and Individual Therapy/CBT, Motivational counseling for substance abuse related issues.   >Dispo: Collateral and dispo planning pending further symptom and medication optimization

## 2022-09-22 ENCOUNTER — APPOINTMENT (OUTPATIENT)
Dept: SURGERY | Facility: CLINIC | Age: 82
End: 2022-09-22

## 2022-09-22 ENCOUNTER — LABORATORY RESULT (OUTPATIENT)
Age: 82
End: 2022-09-22

## 2022-09-22 ENCOUNTER — NON-APPOINTMENT (OUTPATIENT)
Age: 82
End: 2022-09-22

## 2022-09-22 VITALS
WEIGHT: 195 LBS | TEMPERATURE: 97 F | DIASTOLIC BLOOD PRESSURE: 79 MMHG | BODY MASS INDEX: 28.88 KG/M2 | OXYGEN SATURATION: 95 % | SYSTOLIC BLOOD PRESSURE: 126 MMHG | HEIGHT: 69 IN | HEART RATE: 59 BPM

## 2022-09-22 DIAGNOSIS — C44.519 BASAL CELL CARCINOMA OF SKIN OF OTHER PART OF TRUNK: ICD-10-CM

## 2022-09-22 PROCEDURE — 11606 EXC TR-EXT MAL+MARG >4 CM: CPT

## 2022-09-22 PROCEDURE — 13121 CMPLX RPR S/A/L 2.6-7.5 CM: CPT

## 2022-09-22 PROCEDURE — 99213 OFFICE O/P EST LOW 20 MIN: CPT | Mod: 57

## 2022-09-29 PROBLEM — C44.519 BCC (BASAL CELL CARCINOMA), BACK: Status: ACTIVE | Noted: 2022-09-29

## 2022-09-29 NOTE — PROCEDURE
[FreeTextEntry1] : patient was consented for the procedure\par \par complications were discussed including: infection, bleeding, wound dehiscence, need to repeat surgery for positive margins, need for adjuvant treatment, recurrence of tumor, poor cosmetic outcomes, anaesthesia related   complications, other perioperative morbidities and mortality \par \par \par preoperative diagnosis BCC back\par postoperative diagnosis same \par \par procedure \par wide local excision of  6/4   X 3.1 X 1.5  cm with local advancement fasciocutaneous flap to close a skin defect of   6.4 X  3.1 cm\par \par \par counts : correct \par \par specimen : 12: 00 superior sutured \par \par complications: non\par \par procedure details:\par \par patient was prepped and draped in usual sterile fashion using betadine. infiltration with lidocain/epi was performed to elliptical skin around lesion. we achieved a margin of 0.4-1cm around malignant lesion and used #15 surgical blade. we divided superficial fascia and subcutaneous fat to deep fascia. we raised skin flap on each side to achieve fasciocutaneous local advancement flap to close the skin defect.  we avoid electrocautery to preserve   neurovascular bundles in the local flaps. the superficial fascia/deep dermis were closed using 2-0 PDS and skin was closed with subcuticular fashion using 4-0 monocryl. sterile dressing was applied and patient experience no pain during procedure.\par

## 2022-09-29 NOTE — CONSULT LETTER
[Dear  ___] : Dear  [unfilled], [Consult Letter:] : I had the pleasure of evaluating your patient, [unfilled]. [Please see my note below.] : Please see my note below. [Consult Closing:] : Thank you very much for allowing me to participate in the care of this patient.  If you have any questions, please do not hesitate to contact me. [Sincerely,] : Sincerely, [FreeTextEntry3] : Eduar Mckoy MD

## 2022-09-29 NOTE — HISTORY OF PRESENT ILLNESS
[de-identified] : THERESE ALMANZA  is a pleasant 82 year year old man  who came in 09/22/2022 for BCC bx done 8/25/2022 for back skin lesion and path showed nodular BCC\par \par

## 2022-10-06 ENCOUNTER — APPOINTMENT (OUTPATIENT)
Dept: SURGERY | Facility: CLINIC | Age: 82
End: 2022-10-06

## 2022-10-06 VITALS
TEMPERATURE: 97 F | HEIGHT: 69 IN | OXYGEN SATURATION: 95 % | DIASTOLIC BLOOD PRESSURE: 83 MMHG | WEIGHT: 190 LBS | BODY MASS INDEX: 28.14 KG/M2 | HEART RATE: 54 BPM | SYSTOLIC BLOOD PRESSURE: 127 MMHG

## 2022-10-06 PROCEDURE — 99024 POSTOP FOLLOW-UP VISIT: CPT

## 2022-10-19 NOTE — HISTORY OF PRESENT ILLNESS
[de-identified] : 9/22/22 wide local excision of BCC upper back [de-identified] : THERESE ALMANZA  is a pleasant 82 year year old man  who came in 09/22/2022 for BCC bx done 8/25/2022 for back skin lesion and path showed nodular BCC\par \par

## 2022-10-19 NOTE — PROCEDURE
[FreeTextEntry1] : patient was consented for the procedure\par \par complications were discussed including: infection, bleeding, wound dehiscence, need to repeat surgery for positive margins, need for adjuvant treatment, recurrence of tumor, poor cosmetic outcomes, anaesthesia related   complications, other perioperative morbidities and mortality \par \par \par preoperative diagnosis BCC back\par postoperative diagnosis same \par \par procedure \par wide local excision of  6/4   X 3.1 X 1.5  cm with local advancement fasciocutaneous flap to close a skin defect of   6.4 X  3.1 cm\par \par \par counts : correct \par \par specimen : 12: 00 superior sutured \par \par complications: non\par \par procedure details:\par \par patient was prepped and draped in usual sterile fashion using betadine. infiltration with lidocain/epi was performed to elliptical skin around lesion. we achieved a margin of 0.4-1cm around malignant lesion and used #15 surgical blade. we divided superficial fascia and subcutaneous fat to deep fascia. we raised skin flap on each side to achieve fasciocutaneous local advancement flap to close the skin defect.  we avoid electrocautery to preserve   neurovascular bundles in the local flaps. the superficial fascia/deep dermis were closed using 2-0 PDS and skin was closed with subcuticular fashion using 4-0 monocryl. sterile dressing was applied and patient experience no pain during procedure.\par 
GENERAL: NAD, lying in bed, much more alert today  HEAD:  Atraumatic, Normocephalic  EYES: EOMI, PERRLA, conjunctiva and sclera clear  ENT: Moist mucous membranes  NECK: Supple, No JVD  Pulm: Some mild b/l crackles No wheezing, or rubs. Unlabored respirations  Cardio: Regular rate and rhythm; No murmurs, rubs, or gallops  Gastro: Bowel sounds present; Soft, Nontender, Nondistended. No hepatomegally  EXTREMITIES:  2+ Peripheral Pulses, brisk capillary refill. No clubbing, cyanosis, or edema  NERVOUS SYSTEM:  Alert & Orientedx3 speech clear. No deficits   MSK: FROM all 4 extremities, 4/5 strength. Unstable on feet  SKIN: No rashes or lesions

## 2022-10-19 NOTE — REASON FOR VISIT
[Post Op: _________] : a [unfilled] post op visit [FreeTextEntry1] : s/p wide local excision of BCC upper back [Skin Cancer] : skin caner [Post-Op] : a post-op for [Referred By: ___] : Referred By: [unfilled]

## 2022-10-19 NOTE — ASSESSMENT
[FreeTextEntry1] : \par \par 9/22/2022 WLE of BCC back\par 10/6/22 Patient is here for post operative visit. Excision site to upper back clean dry and intact. No erythema or drainage noted. PAthology reviewed with patient. Resection margins are negative for carcinoma. Copy of pathology report given to patient. Encouraged to call office with any questions or concerns\par Follow up in office prn. Encouraged to follow regularly with dermatologist Dr. Alfredo.

## 2023-01-12 NOTE — ED ADULT TRIAGE NOTE - WEIGHT IN LBS
230.6 Pt resides in apt with grandchildren, 2 steps to enter, flight within, PTA independent with mobility and ADL's

## 2023-01-25 ENCOUNTER — APPOINTMENT (OUTPATIENT)
Dept: GASTROENTEROLOGY | Facility: CLINIC | Age: 83
End: 2023-01-25

## 2023-03-07 NOTE — ED CDU PROVIDER SUBSEQUENT DAY NOTE - NSICDXPASTMEDICALHX_GEN_ALL_CORE_FT
PAST MEDICAL HISTORY:  Coronary artery disease     Diabetes mellitus     Diverticulitis     Dizzy spells At times    H/O nausea     History of diverticulosis     Hypertension     Numbness and tingling Both hand fingers-CT SYNDROME B/L HANDS    SOB (shortness of breath)     
No. RENNY screening performed.  STOP BANG Legend: 0-2 = LOW Risk; 3-4 = INTERMEDIATE Risk; 5-8 = HIGH Risk

## 2024-03-06 NOTE — ED ADULT TRIAGE NOTE - AS TEMP SITE
-- DO NOT REPLY / DO NOT REPLY ALL --  -- Message is from [facility]agement [facility] ([facility]) --    General Patient Message:pt was in er and needs to have fo visit from 2-1. No apts available until next month. Please contact     Caller Information         Type Contact Phone/Fax    03/06/2024 04:53 PM CST Phone (Incoming) Isabel Muñoz (Self) [phone] (M)          Alternative phone number na    Can a detailed message be left? Yes    Message Turnaround:     Is it Working Hours? Yes - Working Hours                      oral

## 2024-07-02 NOTE — ED PROVIDER NOTE - NS ED NOTE AC HIGH RISK COUNTRIES
ANTICOAGULATION     Blanco Osborne is overdue for an INR check.     Reminder letter sent    Frances Ward RN    No

## 2024-09-05 NOTE — ASU PREOP CHECKLIST - NOTHING BY MOUTH SINCE
Follow up visit for history of right breast cancer.     Continue tamoxifen and return for follow up in 6 months.   
19-Dec-2019 23:59

## 2025-08-04 NOTE — ED CDU PROVIDER DISPOSITION NOTE - WR ORDER ID 1
Goal Outcome Evaluation:  Orientation: AnOx4   Aggression Stop Light: Green   Activity: SBA GBW   Diet/BS Checks: Reg   Tele:  NA   IV Access/Drains: R. PIV CDI SL, L. PIV CDI SL.   Pain Management: Denies   Abnormal VS/Results: VSS on RA. AM Hgb recheck last check 7.2. Conditionals to replace >7    Bowel/Bladder: Continent B/B, voiding spontaneously, 1 BM this shift no melena.    Skin/Wounds: Abdominal sites CDI, scattered bruising w/ large bruise on L. Forearm, blanchable redness to coccyx, scab on chest.   Consults: ID, Gen surg   D/C Disposition: Pending   Other Info:   - K,Mag,Phos protocol; Mag & phos replaced- AM rechecks.                            [id number]